# Patient Record
Sex: FEMALE | Race: BLACK OR AFRICAN AMERICAN | Employment: PART TIME | URBAN - METROPOLITAN AREA
[De-identification: names, ages, dates, MRNs, and addresses within clinical notes are randomized per-mention and may not be internally consistent; named-entity substitution may affect disease eponyms.]

---

## 2017-03-20 ENCOUNTER — OFFICE VISIT (OUTPATIENT)
Dept: URGENT CARE | Facility: URGENT CARE | Age: 32
End: 2017-03-20
Payer: COMMERCIAL

## 2017-03-20 VITALS
HEIGHT: 68 IN | HEART RATE: 80 BPM | WEIGHT: 293 LBS | DIASTOLIC BLOOD PRESSURE: 80 MMHG | BODY MASS INDEX: 44.41 KG/M2 | SYSTOLIC BLOOD PRESSURE: 150 MMHG | TEMPERATURE: 98.2 F

## 2017-03-20 DIAGNOSIS — S03.2XXA TOOTH AVULSION, INITIAL ENCOUNTER: Primary | ICD-10-CM

## 2017-03-20 DIAGNOSIS — K08.89 TOOTH PAIN: ICD-10-CM

## 2017-03-20 PROCEDURE — 99214 OFFICE O/P EST MOD 30 MIN: CPT | Performed by: PHYSICIAN ASSISTANT

## 2017-03-20 RX ORDER — AMOXICILLIN 500 MG/1
500 CAPSULE ORAL 3 TIMES DAILY
Qty: 30 CAPSULE | Refills: 0 | Status: SHIPPED | OUTPATIENT
Start: 2017-03-20 | End: 2018-01-29

## 2017-03-20 RX ORDER — HYDROCODONE BITARTRATE AND ACETAMINOPHEN 5; 325 MG/1; MG/1
1-2 TABLET ORAL EVERY 6 HOURS PRN
Qty: 10 TABLET | Refills: 0 | Status: SHIPPED | OUTPATIENT
Start: 2017-03-20 | End: 2018-01-29

## 2017-03-20 NOTE — MR AVS SNAPSHOT
"              After Visit Summary   3/20/2017    Timmy Moses    MRN: [id number]           Patient Information     Date Of Birth          1985        Visit Information        Provider Department      3/20/2017 2:45 PM David George PA-C [facility]        Today's Diagnoses     Tooth avulsion, initial encounter    -  1    Tooth pain           Follow-ups after your visit        Who to contact     If you have questions or need follow up information about today's clinic visit or your schedule please contact [facility] directly at [phone].  Normal or non-critical lab and imaging results will be communicated to you by MyChart, letter or phone within 4 business days after the clinic has received the results. If you do not hear from us within 7 days, please contact the clinic through [employer] or phone. If you have a critical or abnormal lab result, we will notify you by phone as soon as possible.  Submit refill requests through [employer] or call your pharmacy and they will forward the refill request to us. Please allow 3 business days for your refill to be completed.          Additional Information About Your Visit        MyChart Information     [employer] lets you send messages to your doctor, view your test results, renew your prescriptions, schedule appointments and more. To sign up, go to [email or web address] . Click on \"Log in\" on the left side of the screen, which will take you to the Welcome page. Then click on \"Sign up Now\" on the right side of the page.     You will be asked to enter the access code listed below, as well as some personal information. Please follow the directions to create your username and password.     Your access code is: [email or web address]  Expires: 2017  6:11 PM     Your access code will  in 90 days. If you need help or a new code, please call your [place] clinic or [phone].        Care EveryWhere ID     This is " "your Care EveryWhere ID. This could be used by other organizations to access your [place] medical records  [id number]        Your Vitals Were     Pulse Temperature Height BMI (Body Mass Index)          80 98.2  F (36.8  C) 5' 8\" (1.727 m) 44.64 kg/m2         Blood Pressure from Last 3 Encounters:   03/20/17 150/80   07/05/16 116/80   12/23/15 (!) 147/97    Weight from Last 3 Encounters:   03/20/17 293 lb 9.6 oz (133.2 kg)   07/05/16 295 lb (133.8 kg)   09/14/15 286 lb (129.7 kg)              Today, you had the following     No orders found for display         Today's Medication Changes          These changes are accurate as of: 3/20/17 11:59 PM.  If you have any questions, ask your nurse or doctor.               Start taking these medicines.        Dose/Directions    amoxicillin 500 MG capsule   Commonly known as:  AMOXIL   Used for:  Tooth avulsion, initial encounter, Tooth pain   Started by:  David George PA-C        Dose:  500 mg   Take 1 capsule (500 mg) by mouth 3 times daily   Quantity:  30 capsule   Refills:  0       HYDROcodone-acetaminophen 5-325 MG per tablet   Commonly known as:  NORCO   Used for:  Tooth pain, Tooth avulsion, initial encounter   Started by:  David George PA-C        Dose:  1-2 tablet   Take 1-2 tablets by mouth every 6 hours as needed   Quantity:  10 tablet   Refills:  0       MAGIC MOUTHWASH (ENTER INGREDIENTS IN COMMENTS)   Used for:  Tooth avulsion, initial encounter, Tooth pain   Started by:  David George PA-C        Dose:  5-10 mL   Swish and spit 5-10 mLs in mouth every 6 hours as needed Pharmacy please compound 4 grams of carafate susp in 30 ml of Benadryl (12.5 mg/5 ml), 60 ml Maalox and 30 ml Viscous Lidocaine   Quantity:  160 mL   Refills:  0            Where to get your medicines      Some of these will need a paper prescription and others can be bought over the counter.  Ask your nurse if you have questions.     Bring a paper prescription for each of these " medications     amoxicillin 500 MG capsule    HYDROcodone-acetaminophen 5-325 MG per tablet    MAGIC MOUTHWASH (ENTER INGREDIENTS IN COMMENTS)                Primary Care Provider    Physician No Ref-Primary       No address on file        Thank you!     Thank you for choosing [facility]  for your care. Our goal is always to provide you with excellent care. Hearing back from our patients is one way we can continue to improve our services. Please take a few minutes to complete the written survey that you may receive in the mail after your visit with us. Thank you!             Your Updated Medication List - Protect others around you: Learn how to safely use, store and throw away your medicines at www.disposemymeds.org.          This list is accurate as of: 3/20/17 11:59 PM.  Always use your most recent med list.                   Brand Name Dispense Instructions for use    amoxicillin 500 MG capsule    AMOXIL    30 capsule    Take 1 capsule (500 mg) by mouth 3 times daily       HYDROcodone-acetaminophen 5-325 MG per tablet    NORCO    10 tablet    Take 1-2 tablets by mouth every 6 hours as needed       MAGIC MOUTHWASH (ENTER INGREDIENTS IN COMMENTS)     160 mL    Swish and spit 5-10 mLs in mouth every 6 hours as needed Pharmacy please compound 4 grams of carafate susp in 30 ml of Benadryl (12.5 mg/5 ml), 60 ml Maalox and 30 ml Viscous Lidocaine

## 2017-03-20 NOTE — NURSING NOTE
"Chief Complaint   Patient presents with     Dental Pain     Pt c/o tooth pain X 3-5 days. Pt scheduled on Wednesday to see a dentist.     Urgent Care       Initial /80  Pulse 80  Temp 98.2  F (36.8  C)  Ht 5' 8\" (1.727 m)  Wt 293 lb 9.6 oz (133.2 kg)  BMI 44.64 kg/m2 Estimated body mass index is 44.64 kg/(m^2) as calculated from the following:    Height as of this encounter: 5' 8\" (1.727 m).    Weight as of this encounter: 293 lb 9.6 oz (133.2 kg).  Medication Reconciliation: complete    "

## 2017-03-26 NOTE — PROGRESS NOTES
"SUBJECTIVE:  Timmy Moses is a 31 year old female with a chief complaint of tooth and mild facial swelling  Started 5 days ago and worsening  Has appt wed with dentistry    Past Medical History:   Diagnosis Date     Hypertension      Meningitis      Pseudotumor      No Known Allergies  Social History   Substance Use Topics     Smoking status: Current Every Day Smoker     Smokeless tobacco: Never Used      Comment: 2-3 cigarettes per day     Alcohol use 0.0 oz/week     0 Standard drinks or equivalent per week      Comment: occasionally        ROS:  CONSTITUTIONAL:NEGATIVE for fever, chills, change in weight  INTEGUMENTARY/SKIN: POSITIVE for mild swelling  ENT/MOUTH: POSITIVE for tooth pain, and tenderness  RESP:NEGATIVE for significant cough or SOB  CV: NEGATIVE for chest pain, palpitations or peripheral edema  MUSCULOSKELETAL: NEGATIVE for significant arthralgias or myalgia  NEURO: NEGATIVE for weakness, dizziness or paresthesias    OBJECTIVE:   /80  Pulse 80  Temp 98.2  F (36.8  C)  Ht 5' 8\" (1.727 m)  Wt 293 lb 9.6 oz (133.2 kg)  BMI 44.64 kg/m2  GENERAL APPEARANCE: healthy, alert and no distress  EYES: EOMI,  PERRL, conjunctiva clear  HENT: Positive for localized tooth pain  NECK: supple, non-tender to palpation, no adenopathy noted  RESP: lungs clear to auscultation - no rales, rhonchi or wheezes  CV: regular rates and rhythm, normal S1 S2, no murmur noted  ABDOMEN:  soft, nontender, no HSM or masses and bowel sounds normal  SKIN: no suspicious lesions or rashes      ASSESSMENT/PLAN:      ICD-10-CM    1. Tooth avulsion, initial encounter S03.2XXA amoxicillin (AMOXIL) 500 MG capsule     MAGIC MOUTHWASH, ENTER INGREDIENTS IN COMMENTS,     HYDROcodone-acetaminophen (NORCO) 5-325 MG per tablet   2. Tooth pain K08.89 amoxicillin (AMOXIL) 500 MG capsule     MAGIC MOUTHWASH, ENTER INGREDIENTS IN COMMENTS,     HYDROcodone-acetaminophen (NORCO) 5-325 MG per tablet     Follow up with dentist on Wed  "

## 2017-05-18 ENCOUNTER — HOSPITAL ENCOUNTER (EMERGENCY)
Facility: CLINIC | Age: 32
Discharge: HOME | End: 2017-05-19
Attending: EMERGENCY MEDICINE | Admitting: EMERGENCY MEDICINE
Payer: MEDICAID

## 2017-05-18 DIAGNOSIS — S82.402A CLOSED FRACTURE OF LEFT FIBULA, INITIAL ENCOUNTER: ICD-10-CM

## 2017-05-18 PROCEDURE — 99284 EMERGENCY DEPT VISIT MOD MDM: CPT

## 2017-05-18 PROCEDURE — 29515 APPLICATION SHORT LEG SPLINT: CPT | Mod: LT

## 2017-05-18 NOTE — ED AVS SNAPSHOT
[facility] Emergency Department    [address]    [facility] [address]    Phone:  [phone]    Fax:  [phone]                                       Timmy Moses   MRN: [id number]    Department:  [facility] Emergency Department   Date of Visit:  5/18/2017           After Visit Summary Signature Page     I have received my discharge instructions, and my questions have been answered. I have discussed any challenges I see with this plan with the nurse or doctor.    ..........................................................................................................................................  Patient/Patient Representative Signature      ..........................................................................................................................................  Patient Representative Print Name and Relationship to Patient    ..................................................               ................................................  Date                                            Time    ..........................................................................................................................................  Reviewed by Signature/Title    ...................................................              ..............................................  Date                                                            Time

## 2017-05-18 NOTE — LETTER
[facility] EMERGENCY DEPARTMENT  [address]  [facility] [address]  Phone: [phone]  Fax: [phone]    May 19, 2017        Timmy Velasquezer  [address]  [facility] [address]          To whom it may concern:    RE: Timmy Velasquezer    Patient was seen and treated today at our emergency room. For medical reasons she should not weight bear on the left leg and needs to use crutches. She should avoid prolonged standing (greater than 30 minutes without rest) and should frequently rest and elevate the left leg. These limitations are in effect for one week.    Please contact me for questions or concerns.      Sincerely,        Columba Dillon MD

## 2017-05-18 NOTE — ED AVS SNAPSHOT
[facility] Emergency Department    [address]    [facility] [address]    Phone:  [phone]    Fax:  [phone]                                       Timmy Moses   MRN: [id number]    Department:  [facility] Emergency Department   Date of Visit:  5/18/2017           Patient Information     Date Of Birth          1985        Your diagnoses for this visit were:     Closed fracture of left fibula, initial encounter mildly displaced       You were seen by Columba Dillon MD.      Follow-up Information     [facility]    Why:  call tomorrow for follow-up (within 3 days)    Contact information:    [address]  [address]  [facility] [address] [phone]        Follow up with [facility] Emergency Department.    Specialty:  EMERGENCY MEDICINE    Why:  As needed, If symptoms worsen    Contact information:    [address] [facility] [address] [phone]      Discharge References/Attachments     ANKLE FRACTURE, DISTAL FIBULA (ENGLISH)      24 Hour Appointment Hotline       To make an appointment at any [facility] clinic, call [phone] ([phone]). If you don't have a family doctor or clinic, we will help you find one. [facility] clinics are conveniently located to serve the needs of you and your family.             Review of your medicines      START taking        Dose / Directions Last dose taken    oxyCODONE-acetaminophen 5-325 MG per tablet   Commonly known as:  PERCOCET   Dose:  1-2 tablet   Quantity:  15 tablet        Take 1-2 tablets by mouth every 4 hours as needed for pain   Refills:  0          Our records show that you are taking the medicines listed below. If these are incorrect, please call your family doctor or clinic.        Dose / Directions Last dose taken    amoxicillin 500 MG capsule   Commonly known as:  AMOXIL   Dose:  500 mg   Quantity:  30  capsule        Take 1 capsule (500 mg) by mouth 3 times daily   Refills:  0        HYDROcodone-acetaminophen 5-325 MG per tablet   Commonly known as:  NORCO   Dose:  1-2 tablet   Quantity:  10 tablet        Take 1-2 tablets by mouth every 6 hours as needed   Refills:  0        MAGIC MOUTHWASH (ENTER INGREDIENTS IN COMMENTS)   Dose:  5-10 mL   Quantity:  160 mL        Swish and spit 5-10 mLs in mouth every 6 hours as needed Pharmacy please compound 4 grams of carafate susp in 30 ml of Benadryl (12.5 mg/5 ml), 60 ml Maalox and 30 ml Viscous Lidocaine   Refills:  0        MOTRIN IB PO        Refills:  0                Prescriptions were sent or printed at these locations (1 Prescription)                   Other Prescriptions                Printed at Department/Unit printer (1 of 1)         oxyCODONE-acetaminophen (PERCOCET) 5-325 MG per tablet                Procedures and tests performed during your visit     Ankle XR, G/E 3 views, left      Orders Needing Specimen Collection     None      Pending Results     No orders found for last 3 day(s).            Pending Culture Results     No orders found for last 3 day(s).            Pending Results Instructions     If you had any lab results that were not finalized at the time of your Discharge, you can call the ED Lab Result RN at [phone]. You will be contacted by this team for any positive Lab results or changes in treatment. The nurses are available 7 days a week from 10A to 6:30P.  You can leave a message 24 hours per day and they will return your call.        Test Results From Your Hospital Stay        5/19/2017 12:32 AM      Narrative     XR ANKLE LT G/E 3 VW  5/19/2017 12:12 AM     INDICATION: Fall.    COMPARISON: None.        Impression     IMPRESSION: Minimally comminuted and mildly displaced oblique fracture  of the distal fibula which begins at the level of the tibiotalar joint  and extends 3.8 cm proximally. There may be very slight widening of  the medial  ankle mortise. Moderate soft tissue swelling about the  ankle, most marked laterally. Otherwise negative.    RIMA SERRANO MD                Clinical Quality Measure: Blood Pressure Screening     Your blood pressure was checked while you were in the emergency department today. The last reading we obtained was  BP: (!) 141/99 . Please read the guidelines below about what these numbers mean and what you should do about them.  If your systolic blood pressure (the top number) is less than 120 and your diastolic blood pressure (the bottom number) is less than 80, then your blood pressure is normal. There is nothing more that you need to do about it.  If your systolic blood pressure (the top number) is 120-139 or your diastolic blood pressure (the bottom number) is 80-89, your blood pressure may be higher than it should be. You should have your blood pressure rechecked within a year by a primary care provider.  If your systolic blood pressure (the top number) is 140 or greater or your diastolic blood pressure (the bottom number) is 90 or greater, you may have high blood pressure. High blood pressure is treatable, but if left untreated over time it can put you at risk for heart attack, stroke, or kidney failure. You should have your blood pressure rechecked by a primary care provider within the next 4 weeks.  If your provider in the emergency department today gave you specific instructions to follow-up with your doctor or provider even sooner than that, you should follow that instruction and not wait for up to 4 weeks for your follow-up visit.        Thank you for choosing [facility]       Thank you for choosing [facility] for your care. Our goal is always to provide you with excellent care. Hearing back from our patients is one way we can continue to improve our services. Please take a few minutes to complete the written survey that you may receive in the mail after you visit with us. Thank you!        MyChart Information  "    [employer] lets you send messages to your doctor, view your test results, renew your prescriptions, schedule appointments and more. To sign up, go to [email or web address] . Click on \"Log in\" on the left side of the screen, which will take you to the Welcome page. Then click on \"Sign up Now\" on the right side of the page.     You will be asked to enter the access code listed below, as well as some personal information. Please follow the directions to create your username and password.     Your access code is: [email or web address]  Expires: 2017  6:11 PM     Your access code will  in 90 days. If you need help or a new code, please call your [place] clinic or [phone].        Care EveryWhere ID     This is your Care EveryWhere ID. This could be used by other organizations to access your [place] medical records  [id number]        After Visit Summary       This is your record. Keep this with you and show to your community pharmacist(s) and doctor(s) at your next visit.                  "

## 2017-05-19 ENCOUNTER — APPOINTMENT (OUTPATIENT)
Dept: GENERAL RADIOLOGY | Facility: CLINIC | Age: 32
End: 2017-05-19
Attending: EMERGENCY MEDICINE
Payer: MEDICAID

## 2017-05-19 VITALS
SYSTOLIC BLOOD PRESSURE: 132 MMHG | DIASTOLIC BLOOD PRESSURE: 82 MMHG | BODY MASS INDEX: 44.41 KG/M2 | OXYGEN SATURATION: 99 % | TEMPERATURE: 96.4 F | HEART RATE: 78 BPM | RESPIRATION RATE: 20 BRPM | WEIGHT: 293 LBS | HEIGHT: 68 IN

## 2017-05-19 PROCEDURE — 73610 X-RAY EXAM OF ANKLE: CPT | Mod: LT

## 2017-05-19 RX ORDER — OXYCODONE AND ACETAMINOPHEN 5; 325 MG/1; MG/1
1-2 TABLET ORAL EVERY 4 HOURS PRN
Qty: 15 TABLET | Refills: 0 | Status: SHIPPED | OUTPATIENT
Start: 2017-05-19 | End: 2018-01-29

## 2017-05-19 NOTE — ED PROVIDER NOTES
"  History     Chief Complaint:  Fall, ankle pain    HPI   Timmy Moses is a 31 year old female who presents to the emergency department today for evaluation after fall with left ankle pain. Ms. Moses reports she was roller skating for the fist time and a rink when she fell at 22:30. No head injury or loss of consciousness. The patient reports left ankle pain and swelling with difficulty ambulating on the left ankle prompting visit. No history of left ankle surgery. She denies any other injuries or pain.     Allergies:  NKDA     Medications:    No daily medications.     Past Medical History:    Hypertension  Pseudotumor  Meningitis  Headache    Past Surgical History:    Gyn surgery    Social History:  Marital Status:   [2]  Tobacco: Current Everyday Smoker  Alcohol: Negative  Presents with female .     Review of Systems   Musculoskeletal:        Positive for left ankle pain.    All other systems reviewed and are negative.    Physical Exam   First Vitals:  BP: (!) 141/99  Heart Rate: 105  Temp: 96.4  F (35.8  C)  Resp: 20  Height: 172.7 cm (5' 8\")  Weight: 133.8 kg (295 lb)  SpO2: 96 %      Physical Exam  General: Adult female sitting upright  CV: 2+ DP and PT pulses left foot and ankle  MSK: Edema diffusely bilateral malleoli left ankle, more prominent laterally. Tender bilateral malleoli without crepitus or deformity. Unable to range at the left ankle due to pain. Nontender over the left foot or remainder of the left lower leg.  Skin: Warm and dry. No rashes or lesions or ecchymoses on visible skin. Toes left foot WWP.  Neuro: Alert and oriented. Responds appropriately to all questions and commands. No focal findings appreciated. Normal muscle tone. Sensation intact to light touch over all dermatomes of the left lower leg including toe webs.   Psych: Normal mood and affect. Pleasant.    Emergency Department Course   Imaging:  Radiographic findings were communicated with the patient and family " who voiced understanding of the findings.    Ankle XR, G/E 3 views, left:  Minimally comminuted and mildly displaced oblique fracture of the distal fibula which begins at the level of the tibiotalar joint and extends 3.8 cm proximally. There may be very slight widening of the medial ankle mortise. Moderate soft tissue swelling about the ankle, most marked laterally. Otherwise negative.  Final reading per radiology.     Procedures:   Splint Placement    PLACEMENT: Custom made Orthoglass posterior short leg and stirrup splint with appropriate padding was applied to the left lower  extremity and after placement I checked and adjusted the fit to ensure proper positioning. The patient was more comfortable with the splint in place. Sensation and circulation are intact after splint placement.     Emergency Department Course:  Nursing notes and vitals reviewed.    23:49 I performed an exam of the patient as documented above.     The patient was taken for x-ray, see imaging results above.     The patient received the intervention(s) above. Patient given crutches with teaching and ambulation trial.    Recheck patient. I performed a splint placement as noted above. Findings and plan explained to the Patient. Patient discharged home with instructions regarding supportive care, medications, and reasons to return. The importance of close follow-up was reviewed.  Impression & Plan    Medical Decision Making:  Timmy Moses is a 31 year old female who presents to the ED with concerns for left ankle injury after fall while roller skating. She has evidence of fibular fracture with slight displacement. She is neurovascularly intact. She is placed in a orthoglass splint as per procedure note. She tolerated this well. I recommended non-weight bearing with crutches. Follow up with orthopedic within 3 days for reassessment. Return immediately to the ED with uncontrolled pain, loss of feeling of foot, or any other symptoms of concern.  Discharged home with appropriate pain medication. All questions answered prior to discharge.     Diagnosis:    ICD-10-CM    1. Closed fracture of left fibula, initial encounter S82.402A     mildly displaced       Disposition:  discharged to home    Discharge Medications:  New Prescriptions    OXYCODONE-ACETAMINOPHEN (PERCOCET) 5-325 MG PER TABLET    Take 1-2 tablets by mouth every 4 hours as needed for pain       Scribe Disclosure:  I, Keri Coronel, am serving as a scribe at 11:49 PM on 5/18/2017 to document services personally performed by Columba Dillon MD, based on my observations and the provider's statements to me.  Keri Coronel  5/18/2017   [facility] EMERGENCY DEPARTMENT       Columba Dillon MD  05/23/17 0506

## 2017-05-19 NOTE — ED NOTES
IN TRIAGE airway,breathing and circulation intact, without need for intervention . Alert and interacting appropriately for age and situation. Fell at 2230 left ankle pain

## 2018-01-29 ENCOUNTER — HOSPITAL ENCOUNTER (EMERGENCY)
Facility: CLINIC | Age: 33
Discharge: HOME | End: 2018-01-29
Attending: EMERGENCY MEDICINE | Admitting: EMERGENCY MEDICINE
Payer: COMMERCIAL

## 2018-01-29 VITALS
WEIGHT: 293 LBS | OXYGEN SATURATION: 100 % | DIASTOLIC BLOOD PRESSURE: 89 MMHG | TEMPERATURE: 98.5 F | SYSTOLIC BLOOD PRESSURE: 147 MMHG | HEIGHT: 68 IN | RESPIRATION RATE: 16 BRPM | BODY MASS INDEX: 44.41 KG/M2 | HEART RATE: 76 BPM

## 2018-01-29 DIAGNOSIS — R51.9 NONINTRACTABLE HEADACHE, UNSPECIFIED CHRONICITY PATTERN, UNSPECIFIED HEADACHE TYPE: ICD-10-CM

## 2018-01-29 DIAGNOSIS — G93.2 PSEUDOTUMOR CEREBRI: ICD-10-CM

## 2018-01-29 PROCEDURE — 96374 THER/PROPH/DIAG INJ IV PUSH: CPT

## 2018-01-29 PROCEDURE — 96361 HYDRATE IV INFUSION ADD-ON: CPT

## 2018-01-29 PROCEDURE — 25000128 H RX IP 250 OP 636: Performed by: EMERGENCY MEDICINE

## 2018-01-29 PROCEDURE — 99284 EMERGENCY DEPT VISIT MOD MDM: CPT | Mod: 25

## 2018-01-29 PROCEDURE — 96375 TX/PRO/DX INJ NEW DRUG ADDON: CPT

## 2018-01-29 RX ORDER — ONDANSETRON 4 MG/1
4 TABLET, ORALLY DISINTEGRATING ORAL EVERY 8 HOURS PRN
Qty: 10 TABLET | Refills: 0 | Status: SHIPPED | OUTPATIENT
Start: 2018-01-29 | End: 2018-02-01

## 2018-01-29 RX ORDER — KETOROLAC TROMETHAMINE 15 MG/ML
15 INJECTION, SOLUTION INTRAMUSCULAR; INTRAVENOUS ONCE
Status: COMPLETED | OUTPATIENT
Start: 2018-01-29 | End: 2018-01-29

## 2018-01-29 RX ORDER — DEXAMETHASONE SODIUM PHOSPHATE 10 MG/ML
10 INJECTION, SOLUTION INTRAMUSCULAR; INTRAVENOUS ONCE
Status: COMPLETED | OUTPATIENT
Start: 2018-01-29 | End: 2018-01-29

## 2018-01-29 RX ORDER — METOCLOPRAMIDE HYDROCHLORIDE 5 MG/ML
10 INJECTION INTRAMUSCULAR; INTRAVENOUS ONCE
Status: COMPLETED | OUTPATIENT
Start: 2018-01-29 | End: 2018-01-29

## 2018-01-29 RX ORDER — DIPHENHYDRAMINE HYDROCHLORIDE 50 MG/ML
25 INJECTION INTRAMUSCULAR; INTRAVENOUS ONCE
Status: COMPLETED | OUTPATIENT
Start: 2018-01-29 | End: 2018-01-29

## 2018-01-29 RX ORDER — ACETAZOLAMIDE 500 MG/1
500 CAPSULE, EXTENDED RELEASE ORAL 2 TIMES DAILY
Qty: 20 CAPSULE | Refills: 0 | Status: SHIPPED | OUTPATIENT
Start: 2018-01-29 | End: 2019-02-06

## 2018-01-29 RX ADMIN — METOCLOPRAMIDE 10 MG: 5 INJECTION, SOLUTION INTRAMUSCULAR; INTRAVENOUS at 06:42

## 2018-01-29 RX ADMIN — KETOROLAC TROMETHAMINE 15 MG: 15 INJECTION, SOLUTION INTRAMUSCULAR; INTRAVENOUS at 06:44

## 2018-01-29 RX ADMIN — SODIUM CHLORIDE, POTASSIUM CHLORIDE, SODIUM LACTATE AND CALCIUM CHLORIDE 1000 ML: 600; 310; 30; 20 INJECTION, SOLUTION INTRAVENOUS at 06:41

## 2018-01-29 RX ADMIN — DIPHENHYDRAMINE HYDROCHLORIDE 25 MG: 50 INJECTION INTRAMUSCULAR; INTRAVENOUS at 06:48

## 2018-01-29 RX ADMIN — DEXAMETHASONE SODIUM PHOSPHATE 10 MG: 10 INJECTION, SOLUTION INTRAMUSCULAR; INTRAVENOUS at 06:46

## 2018-01-29 NOTE — ED NOTES
Presents with headache for 2 days, worsened this morning when awoke,.  States hx of pseudotumor in head

## 2018-01-29 NOTE — ED PROVIDER NOTES
"  History     Chief Complaint:  Headache      HPI   Timmy Moses is a 32 year old female with history of pseudotumor cerebri, although off medications for some time, who presents via EMS for evaluation of a headache.  She states that she has headache every 2-3 years, the last was in 2015.  She describes a global mild headache with photophobia that is gradually increased since onset.  She has had occasional nausea vomiting.  She denies any vision changes or vision loss.  She denies weakness, numbness, fever, neck pain or stiffness, or any other concerns.    Allergies:  NKDA      Medications:    Motrin     Past Medical History:    Hypertension  Pseudotumor  Meningitis     Past Surgical History:    Gyn surgery     Family History:    History reviewed. No pertinent family history.     Social History:  Tobacco use:    Current every day smoker  Alcohol use:    Positive, occasionally  Marital status:       Accompanied to ED by:  EMS      Review of Systems  Neuro: + as per above  GI: + as per above  All other systems reviewed and negative      Physical Exam   First Vitals:  BP: (!) 154/93  Pulse: 76  Temp: 98.5  F (36.9  C)  Resp: 20  Height: 172.7 cm (5' 8\")  Weight: 136.1 kg (300 lb)  SpO2: 99 %      Physical Exam  Constitutional: Alert, attentive  HENT:    Nose: Nose normal.    Mouth/Throat: Oropharynx is clear, mucous membranes are moist  Eyes: EOM are normal. Pupils are equal, round, and reactive to light.   CV: Regular rate and rhythm, no murmurs, rubs or gallops.  Chest: Effort normal and breath sounds normal.   GI: No distension. There is no tenderness  MSK: Normal range of motion.   Neurological:   GCS 15; A/Ox3; Cranial nerves 2-12 intact;   5/5 strength throughout the upper and lower extremities;   sensation intact to light touch throughout the upper and lower extremities;   2+ DTRs to the bilateral upper and lower extremities (biceps, BRs, patellar, achilles);   normal fine motor coordination intact " bilaterally;   normal gait   No meningismus   Skin: Skin is warm and dry.        Emergency Department Course     Interventions:  Medications   diphenhydrAMINE (BENADRYL) injection 25 mg (25 mg Intravenous Given 1/29/18 0648)   metoclopramide (REGLAN) injection 10 mg (10 mg Intravenous Given 1/29/18 0642)   ketorolac (TORADOL) injection 15 mg (15 mg Intravenous Given 1/29/18 0644)   lactated ringers BOLUS 1,000 mL (0 mLs Intravenous Stopped 1/29/18 0741)   dexamethasone (DECADRON) injection 10 mg (10 mg Intravenous Given 1/29/18 0646)       Emergency Department Course:  Patient was brought to the ED via EMS.     Nursing notes and vitals reviewed.  I performed an exam of the patient as documented above.     I reviewed old records.    On re-evaluation, the patient is feeling better.  I emphasized the importance of PCP follow-up and the strict return precautions provided.  The patient demonstrated understanding of and comfortability with this plan.      Impression & Plan      Medical Decision Making:  This is a 32-year-old female with a history of pseudotumor cerebri, with apparently rare pseudotumor headaches such that she does not take Diamox regularly, who presents for evaluation of headache.  There are no red flags to suggest subarachnoid hemorrhage, meningitis, and her neurologic exam is normal.  She is not having any concerning neurologic's subjective symptoms denies any vision changes.  At this point, LP does not appear to be indicated from a diagnostic or therapeutic perspective.  Symptoms are improved with medications as per above.  She did have good improvement of symptoms after ED discharge with Diamox when this occurred in December 2015.  We will plan for the same, as well as neurology follow-up later this week.  She should return immediately for worse pain, vision changes, or any other concerns.    Diagnosis:    ICD-10-CM    1. Nonintractable headache, unspecified chronicity pattern, unspecified headache  type R51    2. Pseudotumor cerebri G93.2      Disposition:  Home in improved condition      Naseem Anderson MD  [facility] Emergency Department    Discharge Medications:  New Prescriptions    ACETAZOLAMIDE (DIAMOX SEQUEL) 500 MG 12 HR CAPSULE    Take 1 capsule (500 mg) by mouth 2 times daily    ONDANSETRON (ZOFRAN ODT) 4 MG ODT TAB    Take 1 tablet (4 mg) by mouth every 8 hours as needed for nausea            Naseem Anderson MD  01/29/18 0855

## 2018-01-29 NOTE — ED AVS SNAPSHOT
[facility] Emergency Department    [address]    [facility] [address]    Phone:  [phone]    Fax:  [phone]                                       Timmy Moses   MRN: [id number]    Department:  [facility] Emergency Department   Date of Visit:  1/29/2018           After Visit Summary Signature Page     I have received my discharge instructions, and my questions have been answered. I have discussed any challenges I see with this plan with the nurse or doctor.    ..........................................................................................................................................  Patient/Patient Representative Signature      ..........................................................................................................................................  Patient Representative Print Name and Relationship to Patient    ..................................................               ................................................  Date                                            Time    ..........................................................................................................................................  Reviewed by Signature/Title    ...................................................              ..............................................  Date                                                            Time

## 2018-01-29 NOTE — ED AVS SNAPSHOT
[facility] Emergency Department    [address] [place] [address]    Phone:  [phone]    Fax:  [phone]                                       Timmy Moses   MRN: [id number]    Department:  [facility] Emergency Department   Date of Visit:  1/29/2018           Patient Information     Date Of Birth          1985        Your diagnoses for this visit were:     Nonintractable headache, unspecified chronicity pattern, unspecified headache type     Pseudotumor cerebri        You were seen by Naseem Anderson MD.      Follow-up Information     Follow up with Irasema Drew MD In 3 days.    Specialty:  Neurology    Contact information:    [facility]  [address] [facility] [address]  [facility] [address]  [phone]          Discharge Instructions       Discharge Instructions  Headache    You were seen today for a headache. Headaches may be caused by many different things such as muscle tension, sinus inflammation, anxiety and stress, having too little sleep, too much alcohol, some medical conditions or injury. You may have a migraine, which is caused by changes in the blood vessels in your head.  At this time your provider does not find that your headache is a sign of anything dangerous or life-threatening.  However, sometimes the signs of serious illness do not show up right away.      Generally, every Emergency Department visit should have a follow-up clinic visit with either a primary or a specialty clinic/provider. Please follow-up as instructed by your emergency provider today.    Return to the Emergency Department if:    You get a new fever of 100.4 F or higher.    Your headache gets much worse.    You get a stiff neck with your headache.    You get a new headache that is significantly different or worse than headaches you have had before.    You are vomiting (throwing up) and cannot keep food or water down.    You have blurry or double  vision or other problems with your eyes.    You have a new weakness on one side of your body.    You have difficulty with balance which is new.    You or your family thinks you are confused.    You have a seizure.    What can I do to help myself?    Pain medications - You may take a pain medication such as Tylenol  (acetaminophen), Advil , Motrin  (ibuprofen) or Aleve  (naproxen).    Take a pain reliever as soon as you notice symptoms.  Starting medications as soon as you start to have symptoms may lessen the amount of pain you have.    Relaxing in a quiet, dark room may help.    Get enough sleep and eat meals regularly.    You may need to watch for certain foods or other things which may trigger your headaches.  Keeping a journal of your headaches and possible triggers may help you and your primary provider to identify things which you should avoid which may be causing your headaches.  If you were given a prescription for medicine here today, be sure to read all of the information (including the package insert) that comes with your prescription.  This will include important information about the medicine, its side effects, and any warnings that you need to know about.  The pharmacist who fills the prescription can provide more information and answer questions you may have about the medicine.  If you have questions or concerns that the pharmacist cannot address, please call or return to the Emergency Department.   Remember that you can always come back to the Emergency Department if you are not able to see your regular provider in the amount of time listed above, if you get any new symptoms, or if there is anything that worries you.      24 Hour Appointment Hotline       To make an appointment at any [facility], call [phone] ([phone]). If you don't have a family doctor or clinic, we will help you find one. [facility] clinics are conveniently located to serve the needs of you and your family.              Review of your medicines      START taking        Dose / Directions Last dose taken    acetaZOLAMIDE 500 MG 12 hr capsule   Commonly known as:  DIAMOX SEQUEL   Dose:  500 mg   Quantity:  20 capsule        Take 1 capsule (500 mg) by mouth 2 times daily   Refills:  0        ondansetron 4 MG ODT tab   Commonly known as:  ZOFRAN ODT   Dose:  4 mg   Quantity:  10 tablet        Take 1 tablet (4 mg) by mouth every 8 hours as needed for nausea   Refills:  0          Our records show that you are taking the medicines listed below. If these are incorrect, please call your family doctor or clinic.        Dose / Directions Last dose taken    MOTRIN IB PO        Refills:  0                Prescriptions were sent or printed at these locations (2 Prescriptions)                   Other Prescriptions                Printed at Department/Unit printer (2 of 2)         acetaZOLAMIDE (DIAMOX SEQUEL) 500 MG 12 hr capsule               ondansetron (ZOFRAN ODT) 4 MG ODT tab                Orders Needing Specimen Collection     None      Pending Results     No orders found from 1/27/2018 to 1/30/2018.            Pending Culture Results     No orders found from 1/27/2018 to 1/30/2018.            Pending Results Instructions     If you had any lab results that were not finalized at the time of your Discharge, you can call the ED Lab Result RN at [phone]. You will be contacted by this team for any positive Lab results or changes in treatment. The nurses are available 7 days a week from 10A to 6:30P.  You can leave a message 24 hours per day and they will return your call.        Test Results From Your Hospital Stay               Clinical Quality Measure: Blood Pressure Screening     Your blood pressure was checked while you were in the emergency department today. The last reading we obtained was  BP: 147/89 . Please read the guidelines below about what these numbers mean and what you should do about them.  If your systolic blood  "pressure (the top number) is less than 120 and your diastolic blood pressure (the bottom number) is less than 80, then your blood pressure is normal. There is nothing more that you need to do about it.  If your systolic blood pressure (the top number) is 120-139 or your diastolic blood pressure (the bottom number) is 80-89, your blood pressure may be higher than it should be. You should have your blood pressure rechecked within a year by a primary care provider.  If your systolic blood pressure (the top number) is 140 or greater or your diastolic blood pressure (the bottom number) is 90 or greater, you may have high blood pressure. High blood pressure is treatable, but if left untreated over time it can put you at risk for heart attack, stroke, or kidney failure. You should have your blood pressure rechecked by a primary care provider within the next 4 weeks.  If your provider in the emergency department today gave you specific instructions to follow-up with your doctor or provider even sooner than that, you should follow that instruction and not wait for up to 4 weeks for your follow-up visit.        Thank you for choosing [facility]       Thank you for choosing [facility] for your care. Our goal is always to provide you with excellent care. Hearing back from our patients is one way we can continue to improve our services. Please take a few minutes to complete the written survey that you may receive in the mail after you visit with us. Thank you!        Ever[employer]ro[employer] Information     [employer] lets you send messages to your doctor, view your test results, renew your prescriptions, schedule appointments and more. To sign up, go to [email or web address] . Click on \"Log in\" on the left side of the screen, which will take you to the Welcome page. Then click on \"Sign up Now\" on the right side of the page.     You will be asked to enter the access code listed below, as well as some personal information. Please follow the " directions to create your username and password.     Your access code is: [email or web address]  Expires: 2018  7:30 AM     Your access code will  in 90 days. If you need help or a new code, please call your [facility] clinic or [phone].        Care EveryWhere ID     This is your Care EveryWhere ID. This could be used by other organizations to access your [facility] medical records  [id number]        Equal Access to Services     [facility] : Hadii og dennis hadasho Sotalitaali, waaxda luqadaha, qaybta kaalmada adeegyada, leighton lyon . So [facility] [phone].    ATENCIÓN: Si habla español, tiene a larose disposición servicios gratuitos de asistencia lingüística. Llame al [phone].    We comply with applicable federal civil rights laws and [place] laws. We do not discriminate on the basis of race, color, national origin, age, disability, sex, sexual orientation, or gender identity.            After Visit Summary       This is your record. Keep this with you and show to your community pharmacist(s) and doctor(s) at your next visit.

## 2018-01-29 NOTE — ED NOTES
Bed: ED11  Expected date: 1/29/18  Expected time: 5:19 AM  Means of arrival: Ambulance  Comments:  FELICITY 1

## 2018-05-20 ENCOUNTER — HOSPITAL ENCOUNTER (EMERGENCY)
Facility: CLINIC | Age: 33
Discharge: HOME | End: 2018-05-20
Attending: EMERGENCY MEDICINE | Admitting: EMERGENCY MEDICINE

## 2018-05-20 VITALS
TEMPERATURE: 99.4 F | RESPIRATION RATE: 18 BRPM | SYSTOLIC BLOOD PRESSURE: 128 MMHG | OXYGEN SATURATION: 100 % | HEART RATE: 86 BPM | DIASTOLIC BLOOD PRESSURE: 86 MMHG

## 2018-05-20 DIAGNOSIS — K08.89 PAIN, DENTAL: ICD-10-CM

## 2018-05-20 DIAGNOSIS — K04.7 PERIAPICAL ABSCESS: ICD-10-CM

## 2018-05-20 PROCEDURE — 99283 EMERGENCY DEPT VISIT LOW MDM: CPT

## 2018-05-20 RX ORDER — PENICILLIN V POTASSIUM 500 MG/1
500 TABLET, FILM COATED ORAL 4 TIMES DAILY
Qty: 28 TABLET | Refills: 0 | Status: SHIPPED | OUTPATIENT
Start: 2018-05-20 | End: 2019-02-06

## 2018-05-20 RX ORDER — BUPIVACAINE HYDROCHLORIDE 5 MG/ML
INJECTION, SOLUTION PERINEURAL
Status: DISCONTINUED
Start: 2018-05-20 | End: 2018-05-20 | Stop reason: WASHOUT

## 2018-05-20 RX ORDER — BUPIVACAINE HYDROCHLORIDE AND EPINEPHRINE 5; 5 MG/ML; UG/ML
1.8 INJECTION, SOLUTION EPIDURAL; INTRACAUDAL; PERINEURAL ONCE
Status: DISCONTINUED | OUTPATIENT
Start: 2018-05-20 | End: 2018-05-20 | Stop reason: HOSPADM

## 2018-05-20 NOTE — ED AVS SNAPSHOT
[facility] Emergency Department    [address] [place] [address]    Phone:  [phone]    Fax:  [phone]                                       Timmy Moses   MRN: [id number]    Department:  [facility] Emergency Department   Date of Visit:  5/20/2018           Patient Information     Date Of Birth          1985        Your diagnoses for this visit were:     Pain, dental     Periapical abscess        You were seen by Erendira Jeffrey MD.        Discharge Instructions       Please return to the emergency department if you notice progressive swelling, difficulty swallowing, hoarseness or muffled voice, fevers over 101, severe pain or other acute changes.  You must see a dentist in the next 2-3 days to deal with this dental infection further.  Until you are seen by the dentist we will prescribe you an antibiotics to treat the infection.    Discharge Instructions  Dental Pain    You have been seen today for a toothache. Your pain may be caused by an exposed nerve, an infection (pulpitis), a root abscess (pocket of pus), or other problems. You will need to see a dentist for a solution to your tooth problem. Emergency Department care is only to help control your problem until you can see a dentist; we cannot provide complete dental care.  Today, we did not find any sign that your toothache was caused by any dangerous or life-threatening condition, but sometimes symptoms develop over time and cannot be found during an emergency visit, so it is very important that you follow up with your dentist.      Generally, every Emergency Department visit should have a follow-up clinic visit with either a primary or a specialty clinic/provider. Please follow-up as instructed by your emergency provider today.    Return to the Emergency Department if:    You develop a new fever over 100.4 F.    You cannot open your mouth normally, cannot move your tongue well, or cannot  swallow.    You have new or increased swelling of your face or neck.    You develop drainage of pus or foul smelling material from around your tooth.  What can I do to help myself?    Take any antibiotic the provider may have prescribed for you today.    Avoid very hot or very cold foods as both can cause pain.    Make an appointment to see a dentist as soon as possible. Dentists are generally not  on-staff  at hospitals so we cannot  refer  to you to dentist but we may be able to provide a list of dental clinics to help you.  If you were given a prescription for medicine here today, be sure to read all of the information (including the package insert) that comes with your prescription.  This will include important information about the medicine, its side effects, and any warnings that you need to know about.  The pharmacist who fills the prescription can provide more information and answer questions you may have about the medicine.  If you have questions or concerns that the pharmacist cannot address, please call or return to the Emergency Department.   Remember that you can always come back to the Emergency Department if you are not able to see your regular provider in the amount of time listed above, if you get any new symptoms, or if there is anything that worries you.      24 Hour Appointment Hotline       To make an appointment at any [facility], call [phone] ([phone]). If you don't have a family doctor or clinic, we will help you find one. [facility] clinics are conveniently located to serve the needs of you and your family.             Review of your medicines      START taking        Dose / Directions Last dose taken    penicillin V potassium 500 MG tablet   Commonly known as:  VEETID   Dose:  500 mg   Quantity:  28 tablet        Take 1 tablet (500 mg) by mouth 4 times daily for 7 days   Refills:  0          Our records show that you are taking the medicines listed below. If these are  incorrect, please call your family doctor or clinic.        Dose / Directions Last dose taken    acetaZOLAMIDE 500 MG 12 hr capsule   Commonly known as:  DIAMOX SEQUEL   Dose:  500 mg   Quantity:  20 capsule        Take 1 capsule (500 mg) by mouth 2 times daily   Refills:  0        MOTRIN IB PO        Refills:  0                Prescriptions were sent or printed at these locations (1 Prescription)                   Other Prescriptions                Printed at Department/Unit printer (1 of 1)         penicillin V potassium (VEETID) 500 MG tablet                Orders Needing Specimen Collection     None      Pending Results     No orders found from 5/18/2018 to 5/21/2018.            Pending Culture Results     No orders found from 5/18/2018 to 5/21/2018.            Pending Results Instructions     If you had any lab results that were not finalized at the time of your Discharge, you can call the ED Lab Result RN at [phone]. You will be contacted by this team for any positive Lab results or changes in treatment. The nurses are available 7 days a week from 10A to 6:30P.  You can leave a message 24 hours per day and they will return your call.        Test Results From Your Hospital Stay               Clinical Quality Measure: Blood Pressure Screening     Your blood pressure was checked while you were in the emergency department today. The last reading we obtained was  BP: 140/90 . Please read the guidelines below about what these numbers mean and what you should do about them.  If your systolic blood pressure (the top number) is less than 120 and your diastolic blood pressure (the bottom number) is less than 80, then your blood pressure is normal. There is nothing more that you need to do about it.  If your systolic blood pressure (the top number) is 120-139 or your diastolic blood pressure (the bottom number) is 80-89, your blood pressure may be higher than it should be. You should have your blood pressure  "rechecked within a year by a primary care provider.  If your systolic blood pressure (the top number) is 140 or greater or your diastolic blood pressure (the bottom number) is 90 or greater, you may have high blood pressure. High blood pressure is treatable, but if left untreated over time it can put you at risk for heart attack, stroke, or kidney failure. You should have your blood pressure rechecked by a primary care provider within the next 4 weeks.  If your provider in the emergency department today gave you specific instructions to follow-up with your doctor or provider even sooner than that, you should follow that instruction and not wait for up to 4 weeks for your follow-up visit.        Thank you for choosing [facility]       Thank you for choosing [facility] for your care. Our goal is always to provide you with excellent care. Hearing back from our patients is one way we can continue to improve our services. Please take a few minutes to complete the written survey that you may receive in the mail after you visit with us. Thank you!        [employer] Information     [employer] lets you send messages to your doctor, view your test results, renew your prescriptions, schedule appointments and more. To sign up, go to [email or web address] . Click on \"Log in\" on the left side of the screen, which will take you to the Welcome page. Then click on \"Sign up Now\" on the right side of the page.     You will be asked to enter the access code listed below, as well as some personal information. Please follow the directions to create your username and password.     Your access code is: [email or web address]  Expires: 2018  6:53 PM     Your access code will  in 90 days. If you need help or a new code, please call your [facility] clinic or [phone].        Care EveryWhere ID     This is your Care EveryWhere ID. This could be used by other organizations to access your [facility] medical records  [id number]        Equal Access to " Services     [facility]: Zahra Mcclendon, wacristianoda luqadaha, qaybta kaleighton yen. So [facility] [phone].    ATENCIÓN: Si habla español, tiene a larose disposición servicios gratuitos de asistencia lingüística. Llame al [phone].    We comply with applicable federal civil rights laws and [place] laws. We do not discriminate on the basis of race, color, national origin, age, disability, sex, sexual orientation, or gender identity.            After Visit Summary       This is your record. Keep this with you and show to your community pharmacist(s) and doctor(s) at your next visit.

## 2018-05-20 NOTE — DISCHARGE INSTRUCTIONS
Please return to the emergency department if you notice progressive swelling, difficulty swallowing, hoarseness or muffled voice, fevers over 101, severe pain or other acute changes.  You must see a dentist in the next 2-3 days to deal with this dental infection further.  Until you are seen by the dentist we will prescribe you an antibiotics to treat the infection.    Discharge Instructions  Dental Pain    You have been seen today for a toothache. Your pain may be caused by an exposed nerve, an infection (pulpitis), a root abscess (pocket of pus), or other problems. You will need to see a dentist for a solution to your tooth problem. Emergency Department care is only to help control your problem until you can see a dentist; we cannot provide complete dental care.  Today, we did not find any sign that your toothache was caused by any dangerous or life-threatening condition, but sometimes symptoms develop over time and cannot be found during an emergency visit, so it is very important that you follow up with your dentist.      Generally, every Emergency Department visit should have a follow-up clinic visit with either a primary or a specialty clinic/provider. Please follow-up as instructed by your emergency provider today.    Return to the Emergency Department if:    You develop a new fever over 100.4 F.    You cannot open your mouth normally, cannot move your tongue well, or cannot swallow.    You have new or increased swelling of your face or neck.    You develop drainage of pus or foul smelling material from around your tooth.  What can I do to help myself?    Take any antibiotic the provider may have prescribed for you today.    Avoid very hot or very cold foods as both can cause pain.    Make an appointment to see a dentist as soon as possible. Dentists are generally not  on-staff  at hospitals so we cannot  refer  to you to dentist but we may be able to provide a list of dental clinics to help you.  If you were  given a prescription for medicine here today, be sure to read all of the information (including the package insert) that comes with your prescription.  This will include important information about the medicine, its side effects, and any warnings that you need to know about.  The pharmacist who fills the prescription can provide more information and answer questions you may have about the medicine.  If you have questions or concerns that the pharmacist cannot address, please call or return to the Emergency Department.   Remember that you can always come back to the Emergency Department if you are not able to see your regular provider in the amount of time listed above, if you get any new symptoms, or if there is anything that worries you.

## 2018-05-20 NOTE — ED AVS SNAPSHOT
[facility] Emergency Department    [address]    [facility] [address]    Phone:  [phone]    Fax:  [phone]                                       Timmy Moses   MRN: [id number]    Department:  [facility] Emergency Department   Date of Visit:  5/20/2018           After Visit Summary Signature Page     I have received my discharge instructions, and my questions have been answered. I have discussed any challenges I see with this plan with the nurse or doctor.    ..........................................................................................................................................  Patient/Patient Representative Signature      ..........................................................................................................................................  Patient Representative Print Name and Relationship to Patient    ..................................................               ................................................  Date                                            Time    ..........................................................................................................................................  Reviewed by Signature/Title    ...................................................              ..............................................  Date                                                            Time

## 2018-05-20 NOTE — ED NOTES
C/o intermittent pain to tooth for last few weeks, then this morning around 2am, pain was so unbearable she couldn't sleep and her pain meds (ibuprofen) were ineffective.  Upper right, #5 tooth shows exposure.  Pt c/o pus coming from tooth.

## 2018-05-20 NOTE — ED PROVIDER NOTES
History     Chief Complaint:  Dental Pain and Hypertension    HPI   Timmy Moses is a 32 year old female who presents to the emergency department today for evaluation of right upper dental pain. The patient reports that she has had problems with this pain in the past and for the past few weeks but got unbearable around 0200. She states that the pain was so bad she was unable to sleep and ibuprofen did not help the pain. She indicates both swelling and pus from the affected tooth. She denies eating or drinking anything that could have made this pain happen. She currently rates the pain at 8/10. The patient denies any chest pain, shortness of breath, fever, chills, vomiting, headache, or difficulty swelling. The patient reports she last took ibuprofen around 5669-3559.    The patient was also has high blood pressure but she does not have a primary care physician and is not currently taking any medication for this. She denies any headaches or vision changes.  No CP, SOB, numbness or tingling.      Allergies:  No Known Drug Allergies     Medications:    Acetazolamide  Motrin    Past Medical History:    Hypertension  Meningitis  Pseudotumor    Past Surgical History:    Gyn surgery    Family History:    History reviewed. No pertinent family history.     Social History:  Smoking Status: Current Every Day Smoker  Smokeless Tobacco: Never Used  Alcohol Use: Positive  Marital Status:  Single    Review of Systems   All other systems reviewed and are negative.      Physical Exam     Patient Vitals for the past 24 hrs:   BP Temp Temp src Pulse Heart Rate Resp SpO2   05/20/18 1845 128/86 - - - - - -   05/20/18 1830 135/84 - - - - - -   05/20/18 1815 140/90 - - - - - -   05/20/18 1800 (!) 135/116 - - - - - 100 %   05/20/18 1745 (!) 157/101 - - - - - 100 %   05/20/18 1743 (!) 151/94 99.4  F (37.4  C) Oral - 84 18 100 %   05/20/18 1715 (!) 190/126 - - - - - -   05/20/18 1711 (!) 180/125 98.3  F (36.8  C) Temporal 86 - 20 99 %          Physical Exam  General: Resting on the bed.  Ears, Nose, Throat:  External ears normal.  Nose normal.  No pharyngeal erythema, swelling or exudate.  Midline uvula.  Tender over the right maxillary canine with poor dentition.  No abscess or soft tissue swelling over the gum line.  Soft floor of the mouth.  No trismus.      Eyes:  Conjunctivae clear.  Pupils are equal, round, and reactive.   Neck: Normal range of motion.  Neck supple.   CV: Regular rate and rhythm.  No murmurs.      Respiratory: Effort normal and breath sounds normal.  No wheezing or crackles.   Gastrointestinal: Soft.  No distension. There is no tenderness.    Neuro: Alert. Moving all extremities appropriately.  Normal speech.    Skin: Skin is warm and dry.  No rash noted.       Emergency Department Course     Procedures:    Dental Block      INDICATIONS: Dental pain    ANESTHESIA:Left periapical dental nerve block using Bupivacaine with Epinephrine.     PATIENT STATUS: The patient tolerated the procedure well and there were no immediate complications.      Emergency Department Course:    1735 Nursing notes and vitals reviewed.    1749 I performed an exam of the patient as documented above.     1835 The dental block was performed as documented above.     1851 I personally reviewed the physical exam results with the patient and answered all related questions prior to discharge.    Impression & Plan      Medical Decision Making:  Timmy Moses is a 32 year old female with a history of hypertension who presents to the emergency department today for evaluation of dental pain. Vitals signs reviewed, initially was hypertension which improved on repeat, patient was afebrile and vital signs otherwise unremarkable. Broad differential pursued including but not limited to dental abscess, electrolyte or metabolic abnormality, deep space neck infection, mana's angina, etc. Patient is otherwise well appearing nontoxic. At this point most consistent  with periapical abscess. There is no abscess amenable to I & D on examination. She has no trismus, soft floor of the mouth, no evidence of mana's angina or deep space neck infection. She also has no overlying swelling in the face to suggest facial cellulitis or worsening of the abscess. Patient was offered a dental block which was performed as noted above. Her patient was improved after the block. She was given antibiotic for periapical abscess treatment. She has a dental appointment tomorrow which she plans to go to. At this point she will be discharged home. Of note, she was hypertensive initially but on repeat it continues to improve. She also notes that she has a history of high blood pressure but does not take her medications. I suspect the initial high blood pressure is secondary to her pain. With pain relief this BP improved to normal range. Additionally she has no chest pain, shortness of breath, no signs of hypertensive emergency. I will refer her to a primary care doctor for recheck. She voiced understanding of that plan and was discharged in stable condition.     Diagnosis:    ICD-10-CM    1. Pain, dental K08.89    2. Periapical abscess K04.7      Disposition:   The patient is discharged to home.    Discharge Medications:  Discharge Medication List as of 5/20/2018  6:53 PM      START taking these medications    Details   penicillin V potassium (VEETID) 500 MG tablet Take 1 tablet (500 mg) by mouth 4 times daily for 7 days, Disp-28 tablet, R-0, Local Print           Scribe Disclosure:  Zara NICHOLSON, am serving as a scribe at 5:35 PM on 5/20/2018 to document services personally performed by Erendira Jeffrey MD based on my observations and the provider's statements to me.    [facility] EMERGENCY DEPARTMENT       Erendira Jeffrey MD  05/21/18 [id number]

## 2019-02-06 ENCOUNTER — HOSPITAL ENCOUNTER (EMERGENCY)
Facility: CLINIC | Age: 34
Discharge: HOME | End: 2019-02-06
Attending: PHYSICIAN ASSISTANT | Admitting: PHYSICIAN ASSISTANT

## 2019-02-06 VITALS
OXYGEN SATURATION: 100 % | TEMPERATURE: 98.6 F | HEART RATE: 81 BPM | RESPIRATION RATE: 16 BRPM | DIASTOLIC BLOOD PRESSURE: 91 MMHG | SYSTOLIC BLOOD PRESSURE: 145 MMHG

## 2019-02-06 DIAGNOSIS — G93.2 PSEUDOTUMOR CEREBRI SYNDROME: ICD-10-CM

## 2019-02-06 DIAGNOSIS — R51.9 HEADACHE: ICD-10-CM

## 2019-02-06 LAB
ANION GAP SERPL CALCULATED.3IONS-SCNC: 6 MMOL/L (ref 3–14)
BASOPHILS # BLD AUTO: 0.1 10E9/L (ref 0–0.2)
BASOPHILS NFR BLD AUTO: 1.1 %
BUN SERPL-MCNC: 8 MG/DL (ref 7–30)
CALCIUM SERPL-MCNC: 9.2 MG/DL (ref 8.5–10.1)
CHLORIDE SERPL-SCNC: 110 MMOL/L (ref 94–109)
CO2 SERPL-SCNC: 21 MMOL/L (ref 20–32)
CREAT SERPL-MCNC: 1.11 MG/DL (ref 0.52–1.04)
DIFFERENTIAL METHOD BLD: ABNORMAL
EOSINOPHIL # BLD AUTO: 0.1 10E9/L (ref 0–0.7)
EOSINOPHIL NFR BLD AUTO: 1.7 %
ERYTHROCYTE [DISTWIDTH] IN BLOOD BY AUTOMATED COUNT: 15.9 % (ref 10–15)
GFR SERPL CREATININE-BSD FRML MDRD: 64 ML/MIN/{1.73_M2}
GLUCOSE SERPL-MCNC: 93 MG/DL (ref 70–99)
HCG SERPL QL: NEGATIVE
HCT VFR BLD AUTO: 45.9 % (ref 35–47)
HGB BLD-MCNC: 14.2 G/DL (ref 11.7–15.7)
IMM GRANULOCYTES # BLD: 0 10E9/L (ref 0–0.4)
IMM GRANULOCYTES NFR BLD: 0.4 %
LYMPHOCYTES # BLD AUTO: 2 10E9/L (ref 0.8–5.3)
LYMPHOCYTES NFR BLD AUTO: 37.3 %
MCH RBC QN AUTO: 26.8 PG (ref 26.5–33)
MCHC RBC AUTO-ENTMCNC: 30.9 G/DL (ref 31.5–36.5)
MCV RBC AUTO: 87 FL (ref 78–100)
MONOCYTES # BLD AUTO: 0.3 10E9/L (ref 0–1.3)
MONOCYTES NFR BLD AUTO: 5.2 %
NEUTROPHILS # BLD AUTO: 2.9 10E9/L (ref 1.6–8.3)
NEUTROPHILS NFR BLD AUTO: 54.3 %
NRBC # BLD AUTO: 0 10*3/UL
NRBC BLD AUTO-RTO: 0 /100
PLATELET # BLD AUTO: 430 10E9/L (ref 150–450)
POTASSIUM SERPL-SCNC: 3.8 MMOL/L (ref 3.4–5.3)
RBC # BLD AUTO: 5.29 10E12/L (ref 3.8–5.2)
SODIUM SERPL-SCNC: 137 MMOL/L (ref 133–144)
WBC # BLD AUTO: 5.4 10E9/L (ref 4–11)

## 2019-02-06 PROCEDURE — 99284 EMERGENCY DEPT VISIT MOD MDM: CPT | Mod: 25

## 2019-02-06 PROCEDURE — 96375 TX/PRO/DX INJ NEW DRUG ADDON: CPT

## 2019-02-06 PROCEDURE — 96361 HYDRATE IV INFUSION ADD-ON: CPT

## 2019-02-06 PROCEDURE — 96374 THER/PROPH/DIAG INJ IV PUSH: CPT

## 2019-02-06 PROCEDURE — 85025 COMPLETE CBC W/AUTO DIFF WBC: CPT | Performed by: PHYSICIAN ASSISTANT

## 2019-02-06 PROCEDURE — 80048 BASIC METABOLIC PNL TOTAL CA: CPT | Performed by: PHYSICIAN ASSISTANT

## 2019-02-06 PROCEDURE — 84703 CHORIONIC GONADOTROPIN ASSAY: CPT | Performed by: PHYSICIAN ASSISTANT

## 2019-02-06 PROCEDURE — 25000132 ZZH RX MED GY IP 250 OP 250 PS 637: Performed by: PHYSICIAN ASSISTANT

## 2019-02-06 PROCEDURE — 25000128 H RX IP 250 OP 636: Performed by: PHYSICIAN ASSISTANT

## 2019-02-06 RX ORDER — KETOROLAC TROMETHAMINE 15 MG/ML
15 INJECTION, SOLUTION INTRAMUSCULAR; INTRAVENOUS ONCE
Status: COMPLETED | OUTPATIENT
Start: 2019-02-06 | End: 2019-02-06

## 2019-02-06 RX ORDER — ACETAZOLAMIDE 500 MG/1
500 CAPSULE, EXTENDED RELEASE ORAL 2 TIMES DAILY
Qty: 30 CAPSULE | Refills: 0 | Status: SHIPPED | OUTPATIENT
Start: 2019-02-06 | End: 2021-12-27

## 2019-02-06 RX ORDER — ACETAMINOPHEN 500 MG
1000 TABLET ORAL ONCE
Status: COMPLETED | OUTPATIENT
Start: 2019-02-06 | End: 2019-02-06

## 2019-02-06 RX ORDER — METOCLOPRAMIDE HYDROCHLORIDE 5 MG/ML
10 INJECTION INTRAMUSCULAR; INTRAVENOUS ONCE
Status: COMPLETED | OUTPATIENT
Start: 2019-02-06 | End: 2019-02-06

## 2019-02-06 RX ORDER — DIPHENHYDRAMINE HYDROCHLORIDE 50 MG/ML
25 INJECTION INTRAMUSCULAR; INTRAVENOUS ONCE
Status: COMPLETED | OUTPATIENT
Start: 2019-02-06 | End: 2019-02-06

## 2019-02-06 RX ADMIN — KETOROLAC TROMETHAMINE 15 MG: 15 INJECTION, SOLUTION INTRAMUSCULAR; INTRAVENOUS at 12:43

## 2019-02-06 RX ADMIN — SODIUM CHLORIDE 1000 ML: 9 INJECTION, SOLUTION INTRAVENOUS at 12:41

## 2019-02-06 RX ADMIN — DIPHENHYDRAMINE HYDROCHLORIDE 25 MG: 50 INJECTION, SOLUTION INTRAMUSCULAR; INTRAVENOUS at 12:43

## 2019-02-06 RX ADMIN — ACETAMINOPHEN 1000 MG: 500 TABLET, FILM COATED ORAL at 12:41

## 2019-02-06 RX ADMIN — METOCLOPRAMIDE 10 MG: 5 INJECTION, SOLUTION INTRAMUSCULAR; INTRAVENOUS at 12:44

## 2019-02-06 ASSESSMENT — ENCOUNTER SYMPTOMS
VOMITING: 1
WEAKNESS: 0
PHOTOPHOBIA: 1
NECK STIFFNESS: 1
HEADACHES: 1
BACK PAIN: 1

## 2019-02-06 NOTE — ED PROVIDER NOTES
History     Chief Complaint:  Headache      HPI   Timmy Moses is a 33 year old female with a history of pseudotumor cerebri, chronic headaches, meningitis, and hypertension who presents to the ED for evaluation of a headache. The patient was diagnosed with pseudotumor cerebri on 7/31/08 and reports experiencing intermittent headaches with photophobia since then. She has been seen in the ED multiple times for evaluation of this, the last of which was in January of 2018. At that time she was started on Diamox, which she notes typically resolves her headaches. Two days ago, the patient states she developed a recurrent headache with photophobia. She describes this as a regular migraine that quickly increased in severity. She reportedly tried taking her usual Diamox without any relief, the last dose of which was this morning. She additionally experienced one episode of emesis this morning. Thus, she presented to the ED for symptom management. Here in the ED, the patient endorses a continued headache, pain behind her eyes, neck stiffness, and back pain. She denies any weakness, gait problems, or other symptoms. Of note, the patient denies that she currently follows up with a neurologist.     Allergies:  NKDA     Medications:    Acetazolamide    Past Medical History:    Chronic headache  Hypertension  Meningitis  Pseudotumor cerebri    Past Surgical History:    GYN surgery    Family History:    No past pertinent family history.    Social History:  Current every day smoker  Uses alcohol.  Drug use: Yes - marijuana  Marital Status:  Legally  [3]     Review of Systems   Eyes: Positive for photophobia.   Gastrointestinal: Positive for vomiting.   Musculoskeletal: Positive for back pain and neck stiffness. Negative for gait problem.   Neurological: Positive for headaches. Negative for weakness.   All other systems reviewed and are negative.      Physical Exam     Patient Vitals for the past 24 hrs:   BP Temp  Temp src Pulse Heart Rate Resp SpO2   02/06/19 1425 -- -- -- -- -- -- 100 %   02/06/19 1400 -- -- -- -- -- -- 100 %   02/06/19 1345 -- -- -- -- -- -- 100 %   02/06/19 1320 (!) 145/91 -- -- 81 -- -- 95 %   02/06/19 1315 -- -- -- -- -- -- 96 %   02/06/19 1300 -- -- -- -- -- -- 96 %   02/06/19 1250 -- -- -- -- -- -- 97 %   02/06/19 1204 (!) 162/97 98.6  F (37  C) Oral -- 98 16 98 %       Physical Exam  General: Alert, interactive. GCS 15. Photophobia.   Head:  Scalp is atraumatic.  Eyes:  EOM intact. The pupils are equal, round, and reactive to light. No scleral icterus.  ENT:                                      Ears:  The external ears are normal.  Nose:  The external nose is normal.  Throat:  The oropharynx is normal. Mucus membranes are moist.                 Neck:  Normal range of motion. There is no rigidity.   CV:  Regular rate and rhythm. No murmur. 2+ radial pulses  Resp:  Breath sounds are clear bilaterally. Non-labored, no retractions or accessory muscle use.  GI:  Abdomen is soft, no distension, no tenderness.   MS:  Normal range of motion.   Skin:  Warm and dry.   Neuro:  Cranial nerves 2-12 intact; 5/5 strength throughout the upper and lower extremities; sensation intact to light touch throughout the upper and lower extremities;  2+ DTRs to the bilateral upper and lower extremities (biceps, BRs, patellar, achilles); normal gait, No meningismus   Psych:  Awake. Alert.  Appropriate interactions.     Emergency Department Course   Laboratory:  CBC: WBC: 5.4, HGB: 14.2, PLT: 430   BMP: Creatinine 1.11 (H), Cl 110 (H), o/w WNL     HCG blood: Negative    Interventions:  1241 NS 1L IV    Tylenol 1000 mg PO  1243 Toradol 15 mg IV   Benadryl 25 mg IV  1244 Reglan 10 mg IV    Emergency Department Course:  Nursing notes and vitals reviewed. (1217) I performed an exam of the patient as documented above.     IV inserted. Medicine administered as documented above. Blood drawn. This was sent to the lab for further  "testing, results above.    (1320) I consulted with Dr. Pickens, Neurology, regarding the patient's history and presentation here in the emergency department.     (0302) I rechecked the patient and discussed the results of her workup thus far. She felt improved at this time.    Findings and plan explained to the Patient. Patient discharged home with instructions regarding supportive care, medications, and reasons to return. The importance of close follow-up was reviewed.     I personally reviewed the laboratory results with the Patient and answered all related questions prior to discharge.        Impression & Plan    Medical Decision Making:  Timmy Moses is a 33 year old female with medical history including pseudotumor cerebri who presents with a 2 day headache.  The patient has not had any fever, weakness, numbness, paresthesias, neck stiffness, thunderclap, \"worst ever\" character, worst at onset character, seizures, vision changes, or confusion. Meningitis, subarachnoid hemorrhage, CNS tumor, venous thrombosis and stroke are considered as part of the differential, and considered unlikely.   There has been no trauma to suggest subdural hemorrhage.     Patient has a history of pseudotumor tumor cerebri.  I discussed patient with Dr. Pickens of neurology and he agrees that imaging and lumbar puncture not necessary.  Plan to treat the headache and recheck the patient, on recheck, she felt improved and appropriate for discharge home.  The patient was prescribed Diamox last year at the ER and told to follow-up with her neurologist, she never did follow-up and states she only takes Diamox when she starts getting headaches.  I discussed with her that this is not the appropriate usage of this medication and that she should take it daily.  Emphasized importance of close follow-up with neurology, referral provided.  She understands reasons to return including worsening headache, vision changes, fevers, or any other " neurologic symptoms.  She agrees with the plan and all questions/concerns addressed.       Diagnosis:    ICD-10-CM    1. Headache R51    2. Pseudotumor cerebri syndrome G93.2     history of       Disposition:  discharged to home    Scribe Disclosure:  I, Francisca Crockett, am serving as a scribe on 2/6/2019 at 12:17 PM to personally document services performed by Flor Ramos PA-C based on my observations and the provider's statements to me.      Francisca Crockett  2/6/2019   [facility] EMERGENCY DEPARTMENT       Flor Ramos PA-C  02/06/19 1958

## 2019-02-06 NOTE — LETTER
February 6, 2019      To Whom It May Concern:      Timmy Moses was seen in our Emergency Department today, 02/06/19.  I expect her condition to improve over the next 2 days.  She may return to work when improved.    Sincerely,        Bi Holley RN

## 2019-02-06 NOTE — DISCHARGE INSTRUCTIONS
*You may resume diet and activities as tolerated.  Drink plenty of fluids and get plenty of rest.  *Take Diamox as prescribed.   *Follow-up with neurology for a recheck in 2-3 days.  *Return if you develop fever, neck stiffness, sudden onset headache, numbness, weakness, problems with speech/vision/coordination, faint or feel like you will faint or become worse in any way.     Discharge Instructions  Headache    You were seen today for a headache. Headaches may be caused by many different things such as muscle tension, sinus inflammation, anxiety and stress, having too little sleep, too much alcohol, some medical conditions or injury. You may have a migraine, which is caused by changes in the blood vessels in your head.  At this time your provider does not find that your headache is a sign of anything dangerous or life-threatening.  However, sometimes the signs of serious illness do not show up right away.      Generally, every Emergency Department visit should have a follow-up clinic visit with either a primary or a specialty clinic/provider. Please follow-up as instructed by your emergency provider today.    Return to the Emergency Department if:  You get a new fever of 100.4 F or higher.  Your headache gets much worse.  You get a stiff neck with your headache.  You get a new headache that is significantly different or worse than headaches you have had before.  You are vomiting (throwing up) and cannot keep food or water down.  You have blurry or double vision or other problems with your eyes.  You have a new weakness on one side of your body.  You have difficulty with balance which is new.  You or your family thinks you are confused.  You have a seizure.    What can I do to help myself?  Pain medications - You may take a pain medication such as Tylenol  (acetaminophen), Advil , Motrin  (ibuprofen) or Aleve  (naproxen).  Take a pain reliever as soon as you notice symptoms.  Starting medications as soon as you  start to have symptoms may lessen the amount of pain you have.  Relaxing in a quiet, dark room may help.  Get enough sleep and eat meals regularly.  You may need to watch for certain foods or other things which may trigger your headaches.  Keeping a journal of your headaches and possible triggers may help you and your primary provider to identify things which you should avoid which may be causing your headaches.  If you were given a prescription for medicine here today, be sure to read all of the information (including the package insert) that comes with your prescription.  This will include important information about the medicine, its side effects, and any warnings that you need to know about.  The pharmacist who fills the prescription can provide more information and answer questions you may have about the medicine.  If you have questions or concerns that the pharmacist cannot address, please call or return to the Emergency Department.   Remember that you can always come back to the Emergency Department if you are not able to see your regular provider in the amount of time listed above, if you get any new symptoms, or if there is anything that worries you.

## 2019-02-06 NOTE — ED AVS SNAPSHOT
[facility] Emergency Department  [address]  [facility] [address]  Phone:  [phone]  Fax:  [phone]                                    Timmy Moses   MRN: [id number]    Department:  [facility] Emergency Department   Date of Visit:  2/6/2019           After Visit Summary Signature Page    I have received my discharge instructions, and my questions have been answered. I have discussed any challenges I see with this plan with the nurse or doctor.    ..........................................................................................................................................  Patient/Patient Representative Signature      ..........................................................................................................................................  Patient Representative Print Name and Relationship to Patient    ..................................................               ................................................  Date                                   Time    ..........................................................................................................................................  Reviewed by Signature/Title    ...................................................              ..............................................  Date                                               Time          22EPIC Rev 08/18

## 2019-02-06 NOTE — ED TRIAGE NOTES
Pt c/o headache constant since Monday. Sensitive to light. Pt denies n/v. Pt states Hx of Pseudo Tumor. Pt ABCs intact and A&Ox4.

## 2019-08-21 ENCOUNTER — APPOINTMENT (OUTPATIENT)
Dept: CT IMAGING | Facility: CLINIC | Age: 34
End: 2019-08-21
Attending: EMERGENCY MEDICINE

## 2019-08-21 ENCOUNTER — HOSPITAL ENCOUNTER (EMERGENCY)
Facility: CLINIC | Age: 34
Discharge: HOME | End: 2019-08-21
Attending: EMERGENCY MEDICINE | Admitting: EMERGENCY MEDICINE

## 2019-08-21 VITALS
SYSTOLIC BLOOD PRESSURE: 150 MMHG | DIASTOLIC BLOOD PRESSURE: 85 MMHG | RESPIRATION RATE: 22 BRPM | HEART RATE: 80 BPM | TEMPERATURE: 99.1 F | OXYGEN SATURATION: 100 %

## 2019-08-21 DIAGNOSIS — R51.9 NONINTRACTABLE HEADACHE, UNSPECIFIED CHRONICITY PATTERN, UNSPECIFIED HEADACHE TYPE: ICD-10-CM

## 2019-08-21 DIAGNOSIS — M54.9 BACK PAIN, UNSPECIFIED BACK LOCATION, UNSPECIFIED BACK PAIN LATERALITY, UNSPECIFIED CHRONICITY: ICD-10-CM

## 2019-08-21 LAB
ANION GAP SERPL CALCULATED.3IONS-SCNC: 7 MMOL/L (ref 3–14)
B-HCG FREE SERPL-ACNC: <5 IU/L
BASOPHILS # BLD AUTO: 0 10E9/L (ref 0–0.2)
BASOPHILS NFR BLD AUTO: 0.6 %
BUN SERPL-MCNC: 7 MG/DL (ref 7–30)
CALCIUM SERPL-MCNC: 9.2 MG/DL (ref 8.5–10.1)
CHLORIDE SERPL-SCNC: 109 MMOL/L (ref 94–109)
CO2 SERPL-SCNC: 20 MMOL/L (ref 20–32)
CREAT SERPL-MCNC: 0.99 MG/DL (ref 0.52–1.04)
DIFFERENTIAL METHOD BLD: ABNORMAL
EOSINOPHIL # BLD AUTO: 0.1 10E9/L (ref 0–0.7)
EOSINOPHIL NFR BLD AUTO: 1.2 %
ERYTHROCYTE [DISTWIDTH] IN BLOOD BY AUTOMATED COUNT: 15.9 % (ref 10–15)
GFR SERPL CREATININE-BSD FRML MDRD: 73 ML/MIN/{1.73_M2}
GLUCOSE SERPL-MCNC: 99 MG/DL (ref 70–99)
HCT VFR BLD AUTO: 40.5 % (ref 35–47)
HGB BLD-MCNC: 12.6 G/DL (ref 11.7–15.7)
IMM GRANULOCYTES # BLD: 0 10E9/L (ref 0–0.4)
IMM GRANULOCYTES NFR BLD: 0.3 %
LYMPHOCYTES # BLD AUTO: 2 10E9/L (ref 0.8–5.3)
LYMPHOCYTES NFR BLD AUTO: 30.3 %
MCH RBC QN AUTO: 26.8 PG (ref 26.5–33)
MCHC RBC AUTO-ENTMCNC: 31.1 G/DL (ref 31.5–36.5)
MCV RBC AUTO: 86 FL (ref 78–100)
MONOCYTES # BLD AUTO: 0.3 10E9/L (ref 0–1.3)
MONOCYTES NFR BLD AUTO: 4.2 %
NEUTROPHILS # BLD AUTO: 4.1 10E9/L (ref 1.6–8.3)
NEUTROPHILS NFR BLD AUTO: 63.4 %
NRBC # BLD AUTO: 0 10*3/UL
NRBC BLD AUTO-RTO: 0 /100
PLATELET # BLD AUTO: 392 10E9/L (ref 150–450)
POTASSIUM SERPL-SCNC: 4.1 MMOL/L (ref 3.4–5.3)
RBC # BLD AUTO: 4.7 10E12/L (ref 3.8–5.2)
SODIUM SERPL-SCNC: 136 MMOL/L (ref 133–144)
WBC # BLD AUTO: 6.4 10E9/L (ref 4–11)

## 2019-08-21 PROCEDURE — 85025 COMPLETE CBC W/AUTO DIFF WBC: CPT | Performed by: EMERGENCY MEDICINE

## 2019-08-21 PROCEDURE — 70450 CT HEAD/BRAIN W/O DYE: CPT

## 2019-08-21 PROCEDURE — 96374 THER/PROPH/DIAG INJ IV PUSH: CPT

## 2019-08-21 PROCEDURE — 96375 TX/PRO/DX INJ NEW DRUG ADDON: CPT

## 2019-08-21 PROCEDURE — 99285 EMERGENCY DEPT VISIT HI MDM: CPT | Mod: 25

## 2019-08-21 PROCEDURE — 76512 OPH US DX B-SCAN: CPT

## 2019-08-21 PROCEDURE — 84702 CHORIONIC GONADOTROPIN TEST: CPT

## 2019-08-21 PROCEDURE — 25000128 H RX IP 250 OP 636: Performed by: EMERGENCY MEDICINE

## 2019-08-21 PROCEDURE — 96361 HYDRATE IV INFUSION ADD-ON: CPT

## 2019-08-21 PROCEDURE — 80048 BASIC METABOLIC PNL TOTAL CA: CPT | Performed by: EMERGENCY MEDICINE

## 2019-08-21 RX ORDER — IBUPROFEN 600 MG/1
600 TABLET, FILM COATED ORAL EVERY 6 HOURS PRN
Qty: 20 TABLET | Refills: 0 | Status: SHIPPED | OUTPATIENT
Start: 2019-08-21

## 2019-08-21 RX ORDER — DIPHENHYDRAMINE HYDROCHLORIDE 50 MG/ML
50 INJECTION INTRAMUSCULAR; INTRAVENOUS ONCE
Status: COMPLETED | OUTPATIENT
Start: 2019-08-21 | End: 2019-08-21

## 2019-08-21 RX ADMIN — PROCHLORPERAZINE EDISYLATE 10 MG: 5 INJECTION, SOLUTION INTRAMUSCULAR; INTRAVENOUS at 07:29

## 2019-08-21 RX ADMIN — DIPHENHYDRAMINE HYDROCHLORIDE 50 MG: 50 INJECTION, SOLUTION INTRAMUSCULAR; INTRAVENOUS at 07:27

## 2019-08-21 RX ADMIN — SODIUM CHLORIDE 1000 ML: 9 INJECTION, SOLUTION INTRAVENOUS at 07:27

## 2019-08-21 ASSESSMENT — ENCOUNTER SYMPTOMS
FEVER: 0
VOMITING: 0
BACK PAIN: 1
NAUSEA: 1
HEADACHES: 1

## 2019-08-21 NOTE — ED AVS SNAPSHOT
[facility] Emergency Department  [address]  [facility] [address]  Phone:  [phone]  Fax:  [phone]                                    Timmy Moses   MRN: [id number]    Department:  [facility] Emergency Department   Date of Visit:  8/21/2019           After Visit Summary Signature Page    I have received my discharge instructions, and my questions have been answered. I have discussed any challenges I see with this plan with the nurse or doctor.    ..........................................................................................................................................  Patient/Patient Representative Signature      ..........................................................................................................................................  Patient Representative Print Name and Relationship to Patient    ..................................................               ................................................  Date                                   Time    ..........................................................................................................................................  Reviewed by Signature/Title    ...................................................              ..............................................  Date                                               Time          22EPIC Rev 08/18

## 2019-08-21 NOTE — ED PROVIDER NOTES
"  History     Chief Complaint:  Headache      HPI   Timmy Moses is a 33 year old female who presents with a headache. The patient says that she first noticed the headache yesterday. She says that at first it was not bad but it gradually worsened and by night time she was unable to sleep. She says that it felt like her whole head was throbbing and it felt like her eyes were moving (\"bobbing side to side\"). She says that this was not the first time that this has happened. She says that when this happened before she went to get it checked out and was diagnosed with  possible pseudotumor cerebri. She says that she has not sought out any formal neurology evaluation. The patient says that the only new symptom that has occurred was the back pain that started this morning. She says that when she got out of bed she developed a lower back pain.  She notes that it hurts from her neck to her back and that her \"spine hurts from top to bottom\". She endorses the presence of nausea and the use of ibuprofen to no relief. The patient also notes that she uses recreational marijuana. The patient denies any vomiting, fever, increased stress, or use of birth control, IVDA.      Allergies:  No Known Drug Allergies     Medications:    Diamox sequel  Motrin     Past Medical History:    Chronic headache  Hypertension  Meningitis  Pseudotumor   Obesity   Chest  pain  Optic nerve swelling  Papilledema     Past Surgical History:    GYN surgery     Family History:    Hypertension  Diabetes      Social History:  The patient was accompanied to the ED by daughter.  Smoking Status: Current Smoker  Smokeless Tobacco: Never Used  Alcohol Use: Positive  Drug Use: Positive   Marital Status:  Single       Review of Systems   Constitutional: Negative for fever.   Eyes:        Eye pressure    Gastrointestinal: Positive for nausea. Negative for vomiting.   Musculoskeletal: Positive for back pain.   Neurological: Positive for headaches.   All other " systems reviewed and are negative.      Physical Exam     Patient Vitals for the past 24 hrs:   BP Temp Temp src Pulse Heart Rate Resp SpO2   08/21/19 0830 -- -- -- -- -- -- 100 %   08/21/19 0800 -- -- -- -- -- -- 100 %   08/21/19 0730 (!) 150/85 -- -- 80 -- -- 100 %   08/21/19 0715 (!) 161/97 -- -- 95 -- -- 100 %   08/21/19 0703 (!) 143/91 99.1  F (37.3  C) Oral 86 86 22 100 %        Physical Exam  General: Well-nourished, nontoxic  Eyes: PERRL, EOMI, no nystagmus.  No scleral icterus or conjunctival injection.   right eye: 20/20   left eye: 20/20    ENT:  Moist mucus membranes, posterior oropharynx clear without erythema or exudates. TM normal bilaterally  Neck: Supple with full range of motion  Respiratory:  Lungs clear to auscultation bilaterally, no crackles/rubs/wheezes.  Mildly hyperventilating   CV: Normal rate and rhythm, no murmurs/rubs/gallops  GI:  Abdomen soft and non-distended.  No tenderness, guarding or rebound  Skin: Warm, dry.  No rashes or petechiae  MSK: No peripheral edema or calf tenderness. No c/t/l bony tenderness  Neuro: Alert and oriented to person/place/time.  No aphasia/facial droop/dysarthria.  Tongue midline, normal strength at SCM/trapezius/BUE/BLE.  Normal finger to nose. Normal gait.  Negative romberg, sensation intact over face/BUE/BLE  Psychiatric: Anxious appearing.       Emergency Department Course     Imaging:  Radiology findings were communicated with the patient who voiced understanding of the findings.    Head CT w/o contrast  No acute pathology. No bleed, mass, or infarcts. No  change.  Reading per radiology    Laboratory:  Laboratory findings were communicated with the patient who voiced understanding of the findings.    CBC: WBC 6.4, HGB 12.6,   BMP: WNL (Creatinine 0.99)  ISTAT HCG quantitative pregnancy POCT: <5.0    Interventions:  0727 NS 1000 mL IV  0727 Benadryl 50 mg IV  0729 Compazine 10 mg IV    Emergency Department Course:    0701 Nursing notes and vitals  reviewed.    0713 I performed an exam of the patient as documented above.     0720 I did an informal bedside ocular US of the patient    0820 Patient rechecked and updated. The patient's headache is feeling better.     0852 The patient was sent for a CT while in the emergency department, results above.      0926 Patient rechecked and updated. Patient reports feeling better.    0942 The patient is discharged to home.     Impression & Plan      Medical Decision Making:  Timmy Moses is a 33 year old female who presents to the emergency department today for evaluation of headache. She is nontoxic, neurologically intact.  Patient reports a possible history of pseudotumor cerebri. Informal bedside ocular US without evidence to suggest increased ICP.  The patient has not had any fever or neck stiffness so I doubt meningitis.  The headache was gradual in onset, CT without evidence of mass, ischemia, tumorSAH.      Patient reported symptom improvement.  Provided neurology referral. Regarding back pain, no reproducible pain on exam, no indication for emergent imaging at this time, no reported trauma, red flag symptoms and patient neuro intact. The patient should follow-up with the primary physician within 3 days. Return if increasing pain, fever, vomiting or weakness.      Diagnosis:    ICD-10-CM    1. Nonintractable headache, unspecified chronicity pattern, unspecified headache type R51    2. Back pain, unspecified back location, unspecified back pain laterality, unspecified chronicity M54.9      Disposition:   Findings and plan explained to the Patient. Patient discharged home with instructions regarding supportive care, medications, and reasons to return. The importance of close follow-up was reviewed.     Discharge Medications:     Review of your medicines      UNREVIEWED medicines. Ask your doctor about these medicines      Dose / Directions   acetaZOLAMIDE 500 MG 12 hr capsule  Commonly known as:  DIAMOX SEQUEL       Dose:  500 mg  Take 1 capsule (500 mg) by mouth 2 times daily for 15 days  Quantity:  30 capsule  Refills:  0        CONTINUE these medicines which may have CHANGED, or have new prescriptions. If we are uncertain of the size of tablets/capsules you have at home, strength may be listed as something that might have changed.      Dose / Directions   ibuprofen 600 MG tablet  Commonly known as:  ADVIL/MOTRIN  This may have changed:      medication strength    how much to take    when to take this    reasons to take this      Dose:  600 mg  Take 1 tablet (600 mg) by mouth every 6 hours as needed for moderate pain  Quantity:  20 tablet  Refills:  0           Where to get your medicines      Some of these will need a paper prescription and others can be bought over the counter. Ask your nurse if you have questions.    Bring a paper prescription for each of these medications    ibuprofen 600 MG tablet           Scribe Disclosure:  I, Bi Ballard, am serving as a scribe at 7:04 AM on 8/21/2019 to document services personally performed by Michelle Ang DO based on my observations and the provider's statements to me.       [facility] EMERGENCY DEPARTMENT       Michelle Ang DO  08/21/19 1004

## 2019-08-21 NOTE — ED TRIAGE NOTES
Patient here for headache started yesterday, associated with bilateral feet numbness and whole back pain. ABCs intact.

## 2021-05-11 ENCOUNTER — NURSE TRIAGE (OUTPATIENT)
Dept: NURSING | Facility: CLINIC | Age: 36
End: 2021-05-11

## 2021-05-11 ENCOUNTER — APPOINTMENT (OUTPATIENT)
Dept: ULTRASOUND IMAGING | Facility: CLINIC | Age: 36
End: 2021-05-11
Attending: PHYSICIAN ASSISTANT

## 2021-05-11 ENCOUNTER — HOSPITAL ENCOUNTER (EMERGENCY)
Facility: CLINIC | Age: 36
Discharge: HOME | End: 2021-05-11
Attending: PHYSICIAN ASSISTANT | Admitting: PHYSICIAN ASSISTANT

## 2021-05-11 VITALS
TEMPERATURE: 98.8 F | SYSTOLIC BLOOD PRESSURE: 185 MMHG | OXYGEN SATURATION: 100 % | RESPIRATION RATE: 16 BRPM | DIASTOLIC BLOOD PRESSURE: 110 MMHG | HEART RATE: 97 BPM

## 2021-05-11 DIAGNOSIS — R60.0 BILATERAL LOWER EXTREMITY EDEMA: ICD-10-CM

## 2021-05-11 LAB
ALBUMIN SERPL-MCNC: 3.6 G/DL (ref 3.4–5)
ALBUMIN UR-MCNC: NEGATIVE MG/DL
ALP SERPL-CCNC: 66 U/L (ref 40–150)
ALT SERPL W P-5'-P-CCNC: 23 U/L (ref 0–50)
ANION GAP SERPL CALCULATED.3IONS-SCNC: 4 MMOL/L (ref 3–14)
APPEARANCE UR: CLEAR
AST SERPL W P-5'-P-CCNC: 15 U/L (ref 0–45)
BASOPHILS # BLD AUTO: 0 10E9/L (ref 0–0.2)
BASOPHILS NFR BLD AUTO: 0.4 %
BILIRUB SERPL-MCNC: 0.3 MG/DL (ref 0.2–1.3)
BILIRUB UR QL STRIP: NEGATIVE
BUN SERPL-MCNC: 13 MG/DL (ref 7–30)
CALCIUM SERPL-MCNC: 8.9 MG/DL (ref 8.5–10.1)
CHLORIDE SERPL-SCNC: 105 MMOL/L (ref 94–109)
CO2 SERPL-SCNC: 26 MMOL/L (ref 20–32)
COLOR UR AUTO: ABNORMAL
CREAT SERPL-MCNC: 0.92 MG/DL (ref 0.52–1.04)
D DIMER PPP FEU-MCNC: 0.7 UG/ML FEU (ref 0–0.5)
DIFFERENTIAL METHOD BLD: ABNORMAL
EOSINOPHIL # BLD AUTO: 0.3 10E9/L (ref 0–0.7)
EOSINOPHIL NFR BLD AUTO: 3.8 %
ERYTHROCYTE [DISTWIDTH] IN BLOOD BY AUTOMATED COUNT: 15.9 % (ref 10–15)
GFR SERPL CREATININE-BSD FRML MDRD: 80 ML/MIN/{1.73_M2}
GLUCOSE SERPL-MCNC: 88 MG/DL (ref 70–99)
GLUCOSE UR STRIP-MCNC: NEGATIVE MG/DL
HCG UR QL: NEGATIVE
HCT VFR BLD AUTO: 38.4 % (ref 35–47)
HGB BLD-MCNC: 12.1 G/DL (ref 11.7–15.7)
HGB UR QL STRIP: ABNORMAL
IMM GRANULOCYTES # BLD: 0 10E9/L (ref 0–0.4)
IMM GRANULOCYTES NFR BLD: 0.1 %
KETONES UR STRIP-MCNC: NEGATIVE MG/DL
LEUKOCYTE ESTERASE UR QL STRIP: NEGATIVE
LYMPHOCYTES # BLD AUTO: 3.5 10E9/L (ref 0.8–5.3)
LYMPHOCYTES NFR BLD AUTO: 47 %
MCH RBC QN AUTO: 27.2 PG (ref 26.5–33)
MCHC RBC AUTO-ENTMCNC: 31.5 G/DL (ref 31.5–36.5)
MCV RBC AUTO: 86 FL (ref 78–100)
MONOCYTES # BLD AUTO: 0.5 10E9/L (ref 0–1.3)
MONOCYTES NFR BLD AUTO: 6.1 %
MUCOUS THREADS #/AREA URNS LPF: PRESENT /LPF
NEUTROPHILS # BLD AUTO: 3.2 10E9/L (ref 1.6–8.3)
NEUTROPHILS NFR BLD AUTO: 42.6 %
NITRATE UR QL: NEGATIVE
NRBC # BLD AUTO: 0 10*3/UL
NRBC BLD AUTO-RTO: 0 /100
NT-PROBNP SERPL-MCNC: 113 PG/ML (ref 0–450)
PH UR STRIP: 6 PH (ref 5–7)
PLATELET # BLD AUTO: 318 10E9/L (ref 150–450)
POTASSIUM SERPL-SCNC: 4.1 MMOL/L (ref 3.4–5.3)
PROT SERPL-MCNC: 7.8 G/DL (ref 6.8–8.8)
RBC # BLD AUTO: 4.45 10E12/L (ref 3.8–5.2)
RBC #/AREA URNS AUTO: 1 /HPF (ref 0–2)
SODIUM SERPL-SCNC: 135 MMOL/L (ref 133–144)
SOURCE: ABNORMAL
SP GR UR STRIP: 1.02 (ref 1–1.03)
SQUAMOUS #/AREA URNS AUTO: 2 /HPF (ref 0–1)
TSH SERPL DL<=0.005 MIU/L-ACNC: 2.63 MU/L (ref 0.4–4)
UROBILINOGEN UR STRIP-MCNC: NORMAL MG/DL (ref 0–2)
WBC # BLD AUTO: 7.4 10E9/L (ref 4–11)
WBC #/AREA URNS AUTO: 1 /HPF (ref 0–5)

## 2021-05-11 PROCEDURE — 93970 EXTREMITY STUDY: CPT

## 2021-05-11 PROCEDURE — 81001 URINALYSIS AUTO W/SCOPE: CPT | Performed by: PHYSICIAN ASSISTANT

## 2021-05-11 PROCEDURE — 85025 COMPLETE CBC W/AUTO DIFF WBC: CPT | Performed by: PHYSICIAN ASSISTANT

## 2021-05-11 PROCEDURE — 85379 FIBRIN DEGRADATION QUANT: CPT | Performed by: PHYSICIAN ASSISTANT

## 2021-05-11 PROCEDURE — 80053 COMPREHEN METABOLIC PANEL: CPT | Performed by: PHYSICIAN ASSISTANT

## 2021-05-11 PROCEDURE — 99284 EMERGENCY DEPT VISIT MOD MDM: CPT | Mod: 25

## 2021-05-11 PROCEDURE — 83880 ASSAY OF NATRIURETIC PEPTIDE: CPT | Performed by: PHYSICIAN ASSISTANT

## 2021-05-11 PROCEDURE — 84443 ASSAY THYROID STIM HORMONE: CPT | Performed by: PHYSICIAN ASSISTANT

## 2021-05-11 PROCEDURE — 81025 URINE PREGNANCY TEST: CPT | Performed by: PHYSICIAN ASSISTANT

## 2021-05-11 ASSESSMENT — ENCOUNTER SYMPTOMS
SHORTNESS OF BREATH: 0
DYSURIA: 0
HEMATURIA: 0
COLOR CHANGE: 1
DIFFICULTY URINATING: 0
UNEXPECTED WEIGHT CHANGE: 1
FREQUENCY: 1

## 2021-05-12 NOTE — ED PROVIDER NOTES
History   Chief Complaint:  Leg Swelling       The history is provided by the patient.      Timmy Moses is a 35 year old female with history of hypertension who presents for evaluation of bilateral lower leg swelling. The patient developed right leg swelling first two weeks ago, and now left leg appears mildly swollen as well. She initially thought she dropped something on the right foot, but the left foot began swelling over this past weekend. Swelling is worse on the right side. No chest pain, abdominal pain, abdominal distension or shortness of breath. No urinary issues although she felt like she was urinating a lot today. No rash. No history of blood clots. No oral hormone use. She occasionally smokes marijuana and recently quit smoking cigarettes. No history of liver, kidney, or heart problems. She reports weight gain over the past couple of months.     Review of Systems   Constitutional: Positive for unexpected weight change.   Respiratory: Negative for shortness of breath.    Cardiovascular: Positive for leg swelling. Negative for chest pain.   Genitourinary: Positive for frequency. Negative for decreased urine volume, difficulty urinating, dysuria, enuresis, hematuria and urgency.   Skin: Positive for color change. Negative for rash.   All other systems reviewed and are negative.    Allergies:  No Known Allergies    Medications:  The patient is currently on no regular medications.     Past Medical History:    Chronic headache  Hypertension  Meningitis   Morbid obesity  Pseudotumor cerebri     Past Surgical History:     section    Family History:    Hypertension    Social History:  Presents unaccompanied   PCP: Starla Tovar MD  Tobacco use: Former smoker  Drug use: Positive for marijuana     Physical Exam     Patient Vitals for the past 24 hrs:   BP Temp Temp src Pulse Resp SpO2   21 1940 (!) 185/110 98.8  F (37.1  C) Oral 97 16 100 %       Physical Exam  General: Awake, alert,  pleasant, non-toxic.  Head:  Scalp is NC/AT  Eyes:  Conjunctiva normal, PERRL  ENT:  The external nose and ears are normal.   Neck:  Normal range of motion without rigidity.  CV:  Regular rate and rhythm    No pathologic murmur, rubs, or gallops.  Resp:  Breath sounds are clear bilaterally.  No crackles, wheezes, rhonchi, stridor.    Non-labored, no retractions or accessory muscle use  Abdomen: Abdomen is soft, no distension, no tenderness, no masses. No CVA tenderness.  MS:  1+ bilateral lower extremity edema from feet up to knees, appears grossly symmetric. Normal ROM in all joints without effusions.    No midline cervical, thoracic, or lumbar tenderness  Skin:  Warm and dry, No rash or lesions noted. 2+ peripheral pulses in all extremities  Neuro: Alert and oriented x3.  No gross motor deficits.  No facial asymmetry.  Psych: Awake. Alert. Normal affect. Appropriate interactions.    Emergency Department Course     Imaging:  US Lower Extremity Venous Duplex Bilateral   Final Result   IMPRESSION:   1.  No deep venous thrombosis in the bilateral lower extremities.          Laboratory:  CBC: WBC 7.4, HGB 12.1,    CMP: all WNL (Creatinine 0.92)     Ddimer: 0.7(H)  BNP: 113  TSH with free T4 reflex: 2.63    HCG Qualitative Pregnancy: Negative  UA with Microscopic: Blood: small, Squamous Epithelial: 2(H), Mucous: present, o/w WNL     Emergency Department Course:    Reviewed:  I reviewed nursing notes, vitals, past medical history and care everywhere    Assessments:   I obtained history and examined the patient as noted above.    I rechecked the patient and explained findings.     Disposition:  Discharged to home.      Impression & Plan     Medical Decision Makin yo female presents with swelling in bilateral lower legs.  Workup here reassuring.  Bnp normal no clinical evidence of CHF.  LFT's/albumin normal.  Creatinine normal no signs of kidney failure or proteinuria/dysmorphic RBCs to suggest  nephrotic/nephritic syndrome.  Microscopic hematuria though pt on period.  She is not pregnant.  D dimer mildly elevated though BL US negative for DVT and given bilateral nature lower suspicion for occult DVT.  No abdominal pain or distension to suggest ascites or central clot.  No evidence of cellulitis, NSTI, compartment syndrome, septic joint, fracture of LE.  At this point appears to be isolated peripheral edema likely due to venous insufficiency/lymphadema.  Will trial elevation compression stockings.  If not improving will follow-up with pcp in 1 week for consideration of repeat US or trial of lasix.  Will return if increasing swelling, SOB, fever, chest pain, abdominal pain, anuria/oliguria or other new symptoms.      Covid-19  Timmy Moses was evaluated during a global COVID-19 pandemic, which necessitated consideration that the patient might be at risk for infection with the SARS-CoV-2 virus that causes COVID-19.   Applicable protocols for evaluation were followed during the patient's care.   COVID-19 was considered as part of the patient's evaluation.     Diagnosis:    ICD-10-CM    1. Bilateral lower extremity edema  R60.0        Discharge Medications:  Discharge Medication List as of 5/11/2021 11:05 PM          Scribe Disclosure:  Kandice NICHOLSON, am serving as a scribe at 7:45 PM on 5/11/2021 to document services personally performed by Zeeshan Bocanegra PA-C based on my observations and the provider's statements to me.            Zeeshan Bocanegra PA-C  05/12/21 2024

## 2021-05-12 NOTE — ED TRIAGE NOTES
Here for bilateral ankle and foot swelling started over the weekend, worsen today associated with right ankle/calf tenderness. Stated did dropped something onto her right foot x2 weeks ago, otherwise denies any other subsequent injuries/fall. ABCs intact.

## 2022-05-25 ENCOUNTER — OFFICE VISIT (OUTPATIENT)
Dept: INTERNAL MEDICINE | Facility: CLINIC | Age: 37
End: 2022-05-25
Payer: COMMERCIAL

## 2022-05-25 VITALS
DIASTOLIC BLOOD PRESSURE: 115 MMHG | HEART RATE: 105 BPM | BODY MASS INDEX: 44.41 KG/M2 | SYSTOLIC BLOOD PRESSURE: 193 MMHG | TEMPERATURE: 98.2 F | HEIGHT: 68 IN | WEIGHT: 293 LBS | RESPIRATION RATE: 20 BRPM

## 2022-05-25 DIAGNOSIS — I10 BENIGN ESSENTIAL HYPERTENSION: ICD-10-CM

## 2022-05-25 DIAGNOSIS — E66.01 MORBID OBESITY (H): ICD-10-CM

## 2022-05-25 DIAGNOSIS — Z00.00 ANNUAL PHYSICAL EXAM: Primary | ICD-10-CM

## 2022-05-25 PROBLEM — G93.2 PSEUDOTUMOR CEREBRI: Status: ACTIVE | Noted: 2022-05-25

## 2022-05-25 PROCEDURE — 99385 PREV VISIT NEW AGE 18-39: CPT | Performed by: INTERNAL MEDICINE

## 2022-05-25 PROCEDURE — 99214 OFFICE O/P EST MOD 30 MIN: CPT | Mod: 25 | Performed by: INTERNAL MEDICINE

## 2022-05-25 RX ORDER — CHLORTHALIDONE 25 MG/1
25 TABLET ORAL DAILY
COMMUNITY
Start: 2021-08-16

## 2022-05-25 ASSESSMENT — ENCOUNTER SYMPTOMS
NAUSEA: 0
SHORTNESS OF BREATH: 0
SORE THROAT: 0
FREQUENCY: 0
CONSTIPATION: 0
DIARRHEA: 0
BREAST MASS: 0
CHILLS: 0
HEMATOCHEZIA: 0
PARESTHESIAS: 0
JOINT SWELLING: 0
ABDOMINAL PAIN: 0
HEARTBURN: 0
DYSURIA: 0
WEAKNESS: 0
ARTHRALGIAS: 0
FEVER: 0
NERVOUS/ANXIOUS: 0
PALPITATIONS: 0
DIZZINESS: 0
HEMATURIA: 0
EYE PAIN: 0
HEADACHES: 0
COUGH: 0
MYALGIAS: 0

## 2022-05-25 NOTE — PROGRESS NOTES
SUBJECTIVE:   CC: Timym Moses is an 36 year old woman who presents for preventive health visit.       Patient has been advised of split billing requirements and indicates understanding: Yes  Patient is a 36-year-old -American female who presents to the clinic for her annual physical.  Her main concern today is regards to her weight.  Patient states that she has noticed that over the past several years her weight has been steadily trending upward.  She does not report her initial weight, but her weight today is noted to be 366 pounds.  Her BMI is 55.7.  She reports no changes in her appetite.  Patient states that she does not tend to binge eat, but she does snack frequently.  He does not feel that she eats overly large portion sizes that she will occasionally miss meals due to her work schedule.  Patient has discussed these issues with her OB/GYN, and she has previously been evaluated for thyroid disease as well as blood sugar abnormalities.  Patient has reportedly never had any abnormal lab tests collected.  She did see her OB/GYN for her routine women's exam in September 2021.  At that time, she was noted to have an elevated blood pressure, and she was placed on 25 mg of chlorthalidone.  Patient reports that she did not take the medication except for a few days as she did feel that she was too forgetful about taking it.  Patient is noted to have a blood pressure 193/115 all present in clinic today.  She denies any issues with headaches, change in vision, chest pain, or shortness of breath.  She does report that she has previously had issues with headaches, blood pressure secondary to a diagnosis of pseudotumor cerebri.  Patient has not been seen by any physician for this issue in the past several years.  He is not fasting at this time.  Patient reports that she has never had an abnormal Pap smear.  Her last Pap smear was performed at her September visit with her OB/GYN.    Healthy Habits:     Getting  at least 3 servings of Calcium per day:  NO    Bi-annual eye exam:  NO    Dental care twice a year:  NO    Sleep apnea or symptoms of sleep apnea:  None    Diet:  Regular (no restrictions)    Frequency of exercise:  None    Taking medications regularly:  No    Barriers to taking medications:  Problems remembering to take them    Medication side effects:  None    PHQ-2 Total Score: 1    Additional concerns today:  No    Ability to successfully perform activities of daily living: Yes, no assistance needed  Home safety:  none identified   Hearing impairment: No            Today's PHQ-2 Score:   PHQ-2 ( 1999 Pf[employer]) 5/25/2022   Q1: Little interest or pleasure in doing things 0   Q2: Feeling down, depressed or hopeless 1   PHQ-2 Score 1   Q1: Little interest or pleasure in doing things Not at all   Q2: Feeling down, depressed or hopeless Several days   PHQ-2 Score 1       Abuse: Current or Past (Physical, Sexual or Emotional) - No  Do you feel safe in your environment? Yes    Have you ever done Advance Care Planning? (For example, a Health Directive, POLST, or a discussion with a medical provider or your loved ones about your wishes): No, advance care planning information given to patient to review.  Advanced care planning was discussed at today's visit.    Social History     Tobacco Use     Smoking status: Current Every Day Smoker     Smokeless tobacco: Never Used     Tobacco comment: 2-3 cigarettes per day   Substance Use Topics     Alcohol use: Yes     Alcohol/week: 0.0 standard drinks     Comment: occasionally          Alcohol Use 5/25/2022   Prescreen: >3 drinks/day or >7 drinks/week? Yes   AUDIT SCORE  6       Reviewed orders with patient.  Reviewed health maintenance and updated orders accordingly - Yes      Breast Cancer Screening:    Breast CA Risk Assessment (FHS-7) 5/25/2022   Do you have a family history of breast, colon, or ovarian cancer? No / Unknown         Patient under 40 years of age: Routine Mammogram  "Screening not recommended.   Pertinent mammograms are reviewed under the imaging tab.    History of abnormal Pap smear: NO - age 30- 65 PAP every 3 years recommended     Reviewed and updated as needed this visit by clinical staff                    Reviewed and updated as needed this visit by Provider                       Review of Systems   Constitutional: Negative for chills and fever.   HENT: Negative for congestion, ear pain, hearing loss and sore throat.    Eyes: Negative for pain and visual disturbance.   Respiratory: Negative for cough and shortness of breath.    Cardiovascular: Negative for chest pain, palpitations and peripheral edema.   Gastrointestinal: Negative for abdominal pain, constipation, diarrhea, heartburn, hematochezia and nausea.   Breasts:  Negative for tenderness, breast mass and discharge.   Genitourinary: Positive for vaginal discharge. Negative for dysuria, frequency, genital sores, hematuria, pelvic pain, urgency and vaginal bleeding.   Musculoskeletal: Negative for arthralgias, joint swelling and myalgias.   Skin: Negative for rash.   Neurological: Negative for dizziness, weakness, headaches and paresthesias.   Psychiatric/Behavioral: Negative for mood changes. The patient is not nervous/anxious.           OBJECTIVE:   Blood pressure (!) 193/115, pulse 105, temperature 98.2  F (36.8  C), resp. rate 20, height 1.727 m (5' 8\"), weight (!) 166 kg (366 lb), not currently breastfeeding.      Physical Exam  Vitals and nursing note reviewed.   Constitutional:       Appearance: Normal appearance. She is obese.   HENT:      Head: Normocephalic and atraumatic.      Right Ear: Tympanic membrane, ear canal and external ear normal.      Left Ear: Tympanic membrane, ear canal and external ear normal.      Mouth/Throat:      Mouth: Mucous membranes are moist.      Pharynx: Oropharynx is clear.   Eyes:      Extraocular Movements: Extraocular movements intact.      Conjunctiva/sclera: Conjunctivae " normal.      Pupils: Pupils are equal, round, and reactive to light.   Cardiovascular:      Rate and Rhythm: Normal rate and regular rhythm.      Pulses: Normal pulses.      Heart sounds: Normal heart sounds.   Pulmonary:      Effort: Pulmonary effort is normal.      Breath sounds: Normal breath sounds.   Abdominal:      General: Bowel sounds are normal.      Palpations: Abdomen is soft.   Musculoskeletal:         General: Normal range of motion.   Skin:     General: Skin is warm.      Capillary Refill: Capillary refill takes less than 2 seconds.   Neurological:      General: No focal deficit present.      Mental Status: She is alert and oriented to person, place, and time. Mental status is at baseline.       ASSESSMENT/PLAN:   (Z00.00) Annual physical exam  (primary encounter diagnosis)  Comment: Routine exam completed today.  Patient was noted to have elevated blood pressure readings as previously documented.  We did spend some time discussing dietary and lifestyle modification necessary to help keep her weight and blood pressure under better control.  Patient is not fasting for labs today.  She does report that she did have annual physical labs collected at her OB/GYN visit and September 2021.  Patient also had her Pap smear reportedly performed at that time.  We will have her sign a release of information document so that he can receive her previous medical records for review.  Patient did decline any updates of her immunizations today.    (E66.01) Morbid obesity (H)  Comment: Patient is noted to have a BMI 55.6.  She did initially wish to discuss medications for management of her weight, but I did recommend that we need to get her blood pressure under better control before we consider any medication such as Qsymia or phentermine alone.  I did discuss with her that these medications can cause increases in blood pressure, and we do not wish to exacerbate her already elevated blood pressure readings.  Patient was  "interested in speaking with a nutritionist for dietary recommendations.  A referral was placed for her today.    (I10) Benign essential hypertension  Comment: Patient blood pressure is noted to be significant elevated at 193/115 upon arrival to the clinic.  She does state that this is very typical of her blood pressure readings.  I did discuss with her OB reports first trying to her blood pressure under better control.  Fortunately, she is not reporting any symptoms such as headaches, change in vision, chest pain, shortness of breath.  Patient did wish to resume taking chlorthalidone 25 mg by mouth daily for management of her blood pressure.  Side effects of medication were reviewed.  We did discuss that her last medication that can help improve her blood pressure control.  Patient will check her blood pressure outside the clinic setting, she will contact the clinic with her average readings in approximately 1 to 2 weeks for further recommendations in regards to her blood pressure management.      Patient has been advised of split billing requirements and indicates understanding: Yes    COUNSELING:  Reviewed preventive health counseling, as reflected in patient instructions    Estimated body mass index is 45.61 kg/m  as calculated from the following:    Height as of 1/29/18: 1.727 m (5' 8\").    Weight as of 1/29/18: 136.1 kg (300 lb).    Weight management plan: Patient referred to endocrine and/or weight management specialty    She reports that she has been smoking. She has never used smokeless tobacco.  Tobacco Cessation Action Plan:   Information offered: Patient not interested at this time      Counseling Resources:  ATP IV Guidelines  Pooled Cohorts Equation Calculator  Breast Cancer Risk Calculator  BRCA-Related Cancer Risk Assessment: FHS-7 Tool  FRAX Risk Assessment  ICSI Preventive Guidelines  Dietary Guidelines for Americans, 2010  USDA's MyPlate  ASA Prophylaxis  Lung CA Screening    Jaylon Juarez, " MD  [facility]

## 2022-05-25 NOTE — PATIENT INSTRUCTIONS

## 2025-06-24 NOTE — TELEPHONE ENCOUNTER
Patient reports ankle/foot swelling for 2.5 weeks. Patient states her right side is more swollen than the left side. Swelling is from right above the ankle down towards the toes. No other symptoms. Patient has no PCP with [facility]. Advised per RN triage protocol to see a physician within 4 hours to rule out DVT. Did mention ER might be better option as urgent care has limited imaging availability. Patient states she has no insurance until 6/1. Patient verbalized understanding of the disposition.     Carmen Benjamin, RN/[facility]        COVID 19 Nurse Triage Plan/Patient Instructions    Please be aware that novel coronavirus (COVID-19) may be circulating in the community. If you develop symptoms such as fever, cough, or SOB or if you have concerns about the presence of another infection including coronavirus (COVID-19), please contact your health care provider or visit [email or web address].     Disposition/Instructions    ED Visit recommended. Follow protocol based instructions.     Bring Your Own Device:  Please also bring your smart device(s) (smart phones, tablets, laptops) and their charging cables for your personal use and to communicate with your care team during your visit.    Thank you for taking steps to prevent the spread of this virus.  o Limit your contact with others.  o Wear a simple mask to cover your cough.  o Wash your hands well and often.    Resources    [facility]: About COVID-19: [email or web address]    CDC: What to Do If You're Sick: www.cdc.gov/coronavirus/2019-ncov/about/steps-when-sick.html    CDC: Ending Home Isolation: www.cdc.gov/coronavirus/2019-ncov/hcp/disposition-in-home-patients.html     CDC: Caring for Someone: www.cdc.gov/coronavirus/2019-ncov/if-you-are-sick/care-for-someone.html     [facility]: Interim Guidance for Hospital Discharge to Home: www.health.[facility].[place]/diseases/coronavirus/hcp/hospdischarge.pdf    [facility]  clinical trials (COVID-19 research studies): [email or web address]     Below are the COVID-19 hotlines at the [place] [facility] ([facility]). Interpreters are available.   o For health questions: Call 307-633-0296 or 1-412.135.1650 (7 a.m. to 7 p.m.)  o For questions about schools and childcare: Call 968-106-7804 or 1-723.286.2476 (7 a.m. to 7 p.m.)     Reason for Disposition    [1] Thigh, calf, or ankle swelling AND [2] bilateral AND [3] 1 side is more swollen    Additional Information    Negative: Severe difficulty breathing (e.g., struggling for each breath, speaks in single words)    Negative: Looks like a broken bone or dislocated joint (e.g., crooked or deformed)    Negative: Sounds like a life-threatening emergency to the triager    Negative: Difficulty breathing at rest    Negative: [1] Difficulty breathing with exertion (e.g., walking) AND [2] new onset or worsening    Negative: [1] Red area or streak AND [2] fever    Negative: [1] Swelling is painful to touch AND [2] fever    Negative: [1] Cast on leg or ankle AND [2] now increased pain    Negative: Patient sounds very sick or weak to the triager    Negative: [1] Can't walk or can barely walk AND [2] new onset    Negative: [1] Small area of swelling AND [2] followed an insect bite to the area    Negative: Swelling of one ankle joint    Negative: Swelling of knee is main symptom    Negative: Chest pain    Negative: Entire foot is cool or blue in comparison to other side    Negative: SEVERE leg swelling (e.g., swelling extends above knee, entire leg is swollen, weeping fluid)    Negative: [1] Red area or streak [2] large (> 2 in. or 5 cm)    Negative: [1] Thigh or calf pain AND [2] only 1 side AND [3] present > 1 hour    Negative: [1] Thigh, calf, or ankle swelling AND [2] only 1 side    Protocols used: LEG SWELLING AND EDEMA-A-AH       reviewed